# Patient Record
Sex: FEMALE | Race: WHITE | NOT HISPANIC OR LATINO | Employment: OTHER | URBAN - METROPOLITAN AREA
[De-identification: names, ages, dates, MRNs, and addresses within clinical notes are randomized per-mention and may not be internally consistent; named-entity substitution may affect disease eponyms.]

---

## 2020-04-03 RX ORDER — GABAPENTIN 300 MG/1
CAPSULE ORAL
Qty: 90 CAPSULE | Refills: 5 | Status: SHIPPED | OUTPATIENT
Start: 2020-04-03 | End: 2020-06-10 | Stop reason: SDUPTHER

## 2020-05-13 ENCOUNTER — TELEPHONE (OUTPATIENT)
Dept: FAMILY MEDICINE | Facility: CLINIC | Age: 70
End: 2020-05-13

## 2020-05-13 NOTE — TELEPHONE ENCOUNTER
Benedicto goode that patient has never been seen by Dr. Moseley or any other physicians at [facility]. Patient will need to be seen before any prescriptions can be written.

## 2020-05-13 NOTE — TELEPHONE ENCOUNTER
----- Message from Branden Hsu sent at 5/13/2020  3:29 PM CDT -----  Contact: [facility]  [phone]  Would like to follow up with nurse regarding request for glucose monitor. Please gregoria back at [phone]. ,xmd

## 2020-06-10 ENCOUNTER — OFFICE VISIT (OUTPATIENT)
Dept: FAMILY MEDICINE | Facility: CLINIC | Age: 70
End: 2020-06-10
Payer: MEDICARE

## 2020-06-10 VITALS
HEIGHT: 61 IN | HEART RATE: 78 BPM | WEIGHT: 136 LBS | DIASTOLIC BLOOD PRESSURE: 84 MMHG | BODY MASS INDEX: 25.68 KG/M2 | TEMPERATURE: 99 F | SYSTOLIC BLOOD PRESSURE: 159 MMHG

## 2020-06-10 DIAGNOSIS — F41.1 GAD (GENERALIZED ANXIETY DISORDER): Primary | ICD-10-CM

## 2020-06-10 DIAGNOSIS — R32 URINARY INCONTINENCE, UNSPECIFIED TYPE: ICD-10-CM

## 2020-06-10 DIAGNOSIS — Z79.4 TYPE 2 DIABETES MELLITUS WITH DIABETIC POLYNEUROPATHY, WITH LONG-TERM CURRENT USE OF INSULIN: ICD-10-CM

## 2020-06-10 DIAGNOSIS — M19.041 PRIMARY OSTEOARTHRITIS OF BOTH HANDS: ICD-10-CM

## 2020-06-10 DIAGNOSIS — I65.23 BILATERAL CAROTID ARTERY STENOSIS: ICD-10-CM

## 2020-06-10 DIAGNOSIS — I10 ESSENTIAL HYPERTENSION: ICD-10-CM

## 2020-06-10 DIAGNOSIS — B07.0 PLANTAR WART: ICD-10-CM

## 2020-06-10 DIAGNOSIS — F41.9 ANXIETY: ICD-10-CM

## 2020-06-10 DIAGNOSIS — Z86.73 HISTORY OF CVA (CEREBROVASCULAR ACCIDENT): ICD-10-CM

## 2020-06-10 DIAGNOSIS — R42 VERTIGO: ICD-10-CM

## 2020-06-10 DIAGNOSIS — F51.01 PRIMARY INSOMNIA: ICD-10-CM

## 2020-06-10 DIAGNOSIS — E11.42 TYPE 2 DIABETES MELLITUS WITH DIABETIC POLYNEUROPATHY, WITH LONG-TERM CURRENT USE OF INSULIN: ICD-10-CM

## 2020-06-10 DIAGNOSIS — M19.042 PRIMARY OSTEOARTHRITIS OF BOTH HANDS: ICD-10-CM

## 2020-06-10 PROCEDURE — 99204 OFFICE O/P NEW MOD 45 MIN: CPT | Mod: S$GLB,,, | Performed by: INTERNAL MEDICINE

## 2020-06-10 PROCEDURE — 3079F DIAST BP 80-89 MM HG: CPT | Mod: CPTII,S$GLB,, | Performed by: INTERNAL MEDICINE

## 2020-06-10 PROCEDURE — 99999 PR PBB SHADOW E&M-EST. PATIENT-LVL IV: ICD-10-PCS | Mod: PBBFAC,,, | Performed by: INTERNAL MEDICINE

## 2020-06-10 PROCEDURE — 3079F PR MOST RECENT DIASTOLIC BLOOD PRESSURE 80-89 MM HG: ICD-10-PCS | Mod: CPTII,S$GLB,, | Performed by: INTERNAL MEDICINE

## 2020-06-10 PROCEDURE — 1100F PR PT FALLS ASSESS DOC 2+ FALLS/FALL W/INJURY/YR: ICD-10-PCS | Mod: CPTII,S$GLB,, | Performed by: INTERNAL MEDICINE

## 2020-06-10 PROCEDURE — 3077F SYST BP >= 140 MM HG: CPT | Mod: CPTII,S$GLB,, | Performed by: INTERNAL MEDICINE

## 2020-06-10 PROCEDURE — 99999 PR PBB SHADOW E&M-EST. PATIENT-LVL IV: CPT | Mod: PBBFAC,,, | Performed by: INTERNAL MEDICINE

## 2020-06-10 PROCEDURE — 99204 PR OFFICE/OUTPT VISIT, NEW, LEVL IV, 45-59 MIN: ICD-10-PCS | Mod: S$GLB,,, | Performed by: INTERNAL MEDICINE

## 2020-06-10 PROCEDURE — 1100F PTFALLS ASSESS-DOCD GE2>/YR: CPT | Mod: CPTII,S$GLB,, | Performed by: INTERNAL MEDICINE

## 2020-06-10 PROCEDURE — 1159F MED LIST DOCD IN RCRD: CPT | Mod: S$GLB,,, | Performed by: INTERNAL MEDICINE

## 2020-06-10 PROCEDURE — 3288F PR FALLS RISK ASSESSMENT DOCUMENTED: ICD-10-PCS | Mod: CPTII,S$GLB,, | Performed by: INTERNAL MEDICINE

## 2020-06-10 PROCEDURE — 3288F FALL RISK ASSESSMENT DOCD: CPT | Mod: CPTII,S$GLB,, | Performed by: INTERNAL MEDICINE

## 2020-06-10 PROCEDURE — 1159F PR MEDICATION LIST DOCUMENTED IN MEDICAL RECORD: ICD-10-PCS | Mod: S$GLB,,, | Performed by: INTERNAL MEDICINE

## 2020-06-10 PROCEDURE — 1126F PR PAIN SEVERITY QUANTIFIED, NO PAIN PRESENT: ICD-10-PCS | Mod: S$GLB,,, | Performed by: INTERNAL MEDICINE

## 2020-06-10 PROCEDURE — 3077F PR MOST RECENT SYSTOLIC BLOOD PRESSURE >= 140 MM HG: ICD-10-PCS | Mod: CPTII,S$GLB,, | Performed by: INTERNAL MEDICINE

## 2020-06-10 PROCEDURE — 1126F AMNT PAIN NOTED NONE PRSNT: CPT | Mod: S$GLB,,, | Performed by: INTERNAL MEDICINE

## 2020-06-10 RX ORDER — ROSUVASTATIN CALCIUM 20 MG/1
1 TABLET, COATED ORAL DAILY
COMMUNITY
Start: 2020-06-02 | End: 2020-06-10 | Stop reason: SDUPTHER

## 2020-06-10 RX ORDER — OXYBUTYNIN CHLORIDE 10 MG/1
1 TABLET, EXTENDED RELEASE ORAL DAILY
COMMUNITY
Start: 2020-06-02 | End: 2020-06-10 | Stop reason: SDUPTHER

## 2020-06-10 RX ORDER — ASPIRIN 81 MG/1
81 TABLET ORAL DAILY
COMMUNITY
End: 2024-03-25

## 2020-06-10 RX ORDER — OXYBUTYNIN CHLORIDE 10 MG/1
10 TABLET, EXTENDED RELEASE ORAL DAILY
Qty: 90 TABLET | Refills: 3 | Status: SHIPPED | OUTPATIENT
Start: 2020-06-10 | End: 2020-09-28 | Stop reason: SDUPTHER

## 2020-06-10 RX ORDER — INSULIN GLARGINE 100 [IU]/ML
10 INJECTION, SOLUTION SUBCUTANEOUS NIGHTLY
Qty: 3 ML | Refills: 11 | Status: SHIPPED | OUTPATIENT
Start: 2020-06-10 | End: 2020-06-15 | Stop reason: ALTCHOICE

## 2020-06-10 RX ORDER — TRAZODONE HYDROCHLORIDE 50 MG/1
50 TABLET ORAL NIGHTLY
Qty: 90 TABLET | Refills: 1 | Status: SHIPPED | OUTPATIENT
Start: 2020-06-10 | End: 2021-01-26 | Stop reason: SDUPTHER

## 2020-06-10 RX ORDER — AMLODIPINE BESYLATE 10 MG/1
10 TABLET ORAL DAILY
Qty: 90 TABLET | Refills: 1 | Status: SHIPPED | OUTPATIENT
Start: 2020-06-10 | End: 2020-09-28 | Stop reason: SDUPTHER

## 2020-06-10 RX ORDER — MELOXICAM 15 MG/1
15 TABLET ORAL
COMMUNITY
Start: 2020-01-07 | End: 2020-06-10 | Stop reason: SDUPTHER

## 2020-06-10 RX ORDER — ONDANSETRON 4 MG/1
4 TABLET, FILM COATED ORAL EVERY 8 HOURS PRN
COMMUNITY
Start: 2020-04-24 | End: 2021-09-20

## 2020-06-10 RX ORDER — TRAZODONE HYDROCHLORIDE 50 MG/1
50 TABLET ORAL NIGHTLY
COMMUNITY
End: 2020-06-10 | Stop reason: SDUPTHER

## 2020-06-10 RX ORDER — MELOXICAM 15 MG/1
15 TABLET ORAL
Qty: 30 TABLET | Refills: 3 | Status: SHIPPED | OUTPATIENT
Start: 2020-06-10 | End: 2020-09-28

## 2020-06-10 RX ORDER — INSULIN PUMP SYRINGE, 3 ML
EACH MISCELLANEOUS
COMMUNITY
Start: 2015-05-13 | End: 2020-09-28

## 2020-06-10 RX ORDER — MECLIZINE HCL 12.5 MG 12.5 MG/1
12.5 TABLET ORAL 3 TIMES DAILY PRN
Qty: 30 TABLET | Refills: 1 | Status: SHIPPED | OUTPATIENT
Start: 2020-06-10 | End: 2020-08-13

## 2020-06-10 RX ORDER — AMLODIPINE BESYLATE 10 MG/1
1 TABLET ORAL DAILY
COMMUNITY
Start: 2020-06-02 | End: 2020-06-10 | Stop reason: SDUPTHER

## 2020-06-10 RX ORDER — CLOPIDOGREL BISULFATE 75 MG/1
1 TABLET ORAL DAILY
COMMUNITY
Start: 2020-06-02 | End: 2020-06-10

## 2020-06-10 RX ORDER — INSULIN GLARGINE 100 [IU]/ML
10 INJECTION, SOLUTION SUBCUTANEOUS NIGHTLY
COMMUNITY
Start: 2020-01-16 | End: 2020-06-10 | Stop reason: SDUPTHER

## 2020-06-10 RX ORDER — ESCITALOPRAM OXALATE 10 MG/1
10 TABLET ORAL DAILY
Qty: 30 TABLET | Refills: 11 | Status: SHIPPED | OUTPATIENT
Start: 2020-06-10 | End: 2020-09-28

## 2020-06-10 RX ORDER — ALPRAZOLAM 0.5 MG/1
0.5 TABLET ORAL NIGHTLY
COMMUNITY
Start: 2020-05-04 | End: 2020-12-16 | Stop reason: ALTCHOICE

## 2020-06-10 RX ORDER — PEN NEEDLE, DIABETIC 29 G X1/2"
NEEDLE, DISPOSABLE MISCELLANEOUS
COMMUNITY
Start: 2015-05-13 | End: 2020-06-10 | Stop reason: SDUPTHER

## 2020-06-10 RX ORDER — PEN NEEDLE, DIABETIC 29 G X1/2"
NEEDLE, DISPOSABLE MISCELLANEOUS
Qty: 100 EACH | Refills: 11 | Status: SHIPPED | OUTPATIENT
Start: 2020-06-10

## 2020-06-10 RX ORDER — ROSUVASTATIN CALCIUM 20 MG/1
20 TABLET, COATED ORAL DAILY
Qty: 90 TABLET | Refills: 3 | Status: SHIPPED | OUTPATIENT
Start: 2020-06-10 | End: 2020-12-16 | Stop reason: SDUPTHER

## 2020-06-10 RX ORDER — GABAPENTIN 300 MG/1
300 CAPSULE ORAL 3 TIMES DAILY
Qty: 90 CAPSULE | Refills: 5 | Status: SHIPPED | OUTPATIENT
Start: 2020-06-10 | End: 2020-09-28 | Stop reason: SDUPTHER

## 2020-06-10 RX ORDER — LANCETS
EACH MISCELLANEOUS
COMMUNITY
Start: 2013-12-11 | End: 2020-09-28 | Stop reason: SDUPTHER

## 2020-06-10 RX ORDER — ASPIRIN 325 MG
325 TABLET ORAL DAILY
COMMUNITY
Start: 2020-02-02 | End: 2020-06-10 | Stop reason: ALTCHOICE

## 2020-06-10 NOTE — ASSESSMENT & PLAN NOTE
Last A1C No results found for: LABA1C, HGBA1C  Current meds: lantus 12 units,   Foot exam completed- No  Eye exam completed- No  Microalbumin completed- No  On statin therapy- yes  On ACEI/ARB- no  Ran out of insulin and needs refills. Had Januvia but insurance not paying for it. Still checking glucose, ranging from 120-280. Due for eye exam. Foot exam today.

## 2020-06-10 NOTE — PROGRESS NOTES
Assessment/Plan:    Type 2 diabetes mellitus with diabetic polyneuropathy, with long-term current use of insulin  Last A1C No results found for: LABA1C, HGBA1C  Current meds: lantus 12 units,   Foot exam completed- No  Eye exam completed- No  Microalbumin completed- No  On statin therapy- yes  On ACEI/ARB- no  Ran out of insulin and needs refills. Had Januvia but insurance not paying for it. Still checking glucose, ranging from 120-280. Due for eye exam. Foot exam today.     Hypertension  -above goal today  -currently on amlodipine 10 mg daily  -plan to add an ACE-I once labs return  -denies adverse effects of medications  -continue lifestyle modification with low sodium diet and exercise     History of CVA (cerebrovascular accident)  -history of CVA around 2014  -denies residual deficits  -still on ASA and plavix  -also on high intensity statin  -had never been on ASA prior to CVA  -stop plavix. Continue ASA but at lower dose 81 mg QD    Carotid artery stenosis  -US Jan 2020:   IMPRESSION:   1.  50-69% stenosis of the left internal carotid artery.   2.  Right carotid atheromatous plaquing. There is less than 50% diameter reduction.  -asymptomatic  -continue ASA and statin  -repeat US in one year    Insomnia  -using trazodone at night  -denies adverse effects of medication  -discussed importance of good sleep hygiene practices    Anxiety  -chronic history of anxiety  -on Xanax at night  -plan to start Lexapro  -discussed potential adverse effects  -follow-up in 4 weeks to discuss response to therapy    ______________________________________________________________________________________________________________________________    Orders this visit:    RODERICK (generalized anxiety disorder)  -     escitalopram oxalate (LEXAPRO) 10 MG tablet; Take 1 tablet (10 mg total) by mouth once daily.  Dispense: 30 tablet; Refill: 11    Essential hypertension  -     amLODIPine (NORVASC) 10 MG tablet; Take 1 tablet (10 mg total) by  "mouth once daily.  Dispense: 90 tablet; Refill: 1    Type 2 diabetes mellitus with diabetic polyneuropathy, with long-term current use of insulin  -     Comprehensive metabolic panel; Standing  -     Hemoglobin A1C; Standing  -     Microalbumin/creatinine urine ratio; Standing  -     Discontinue: insulin glargine 100 units/mL (3mL) SubQ pen; Inject 10 Units into the skin every evening.  Dispense: 3 mL; Refill: 11  -     rosuvastatin (CRESTOR) 20 MG tablet; Take 1 tablet (20 mg total) by mouth once daily.  Dispense: 90 tablet; Refill: 3  -     pen needle, diabetic 31 gauge x 1/4" Ndle; Use to check insulin twice daily  Dispense: 100 each; Refill: 11  -     gabapentin (NEURONTIN) 300 MG capsule; Take 1 capsule (300 mg total) by mouth 3 (three) times daily.  Dispense: 90 capsule; Refill: 5  -     Ambulatory referral/consult to Podiatry; Future; Expected date: 06/17/2020    History of CVA (cerebrovascular accident)    Urinary incontinence, unspecified type  -     oxybutynin (DITROPAN-XL) 10 MG 24 hr tablet; Take 1 tablet (10 mg total) by mouth once daily.  Dispense: 90 tablet; Refill: 3    Anxiety    Primary insomnia  -     traZODone (DESYREL) 50 MG tablet; Take 1 tablet (50 mg total) by mouth every evening.  Dispense: 90 tablet; Refill: 1    Primary osteoarthritis of both hands  -     meloxicam (MOBIC) 15 MG tablet; Take 1 tablet (15 mg total) by mouth as needed.  Dispense: 30 tablet; Refill: 3    Vertigo  -     meclizine (ANTIVERT) 12.5 mg tablet; Take 1 tablet (12.5 mg total) by mouth 3 (three) times daily as needed.  Dispense: 30 tablet; Refill: 1    Plantar wart  -     Ambulatory referral/consult to Podiatry; Future; Expected date: 06/17/2020    Bilateral carotid artery stenosis      Follow up in about 3 months (around 9/10/2020).    Remedios Moseley MD  ______________________________________________________________________________________________________________________________    HPI:    Patient is a new patient " to me here to establish care.  Patient is a 70-year-old  female with a past medical history of CVA, carotid artery stenosis, hypertension, type 2 diabetes, osteoarthritis, insomnia and anxiety.    Previous PCP: Dr. Whyte     Hypertension:  Above goal today.  Currently on Norvasc 10 mg daily.  Denies any adverse effects of medications.  Discussed likely needing to add another medication and will likely be ACE-inhibitor or ARB considering history of diabetes.    Type 2 diabetes:  Patient reports remains at goal previously.  She did run out of insulin so has not been using.  Previously on Januvia but insurance stopped paying for it.  Checking glucose daily with ranging from mid 100s to 200s.  Needs refill her insulin.  Does report associated neuropathy.    Osteoarthritis:  Present multiple joints.  Using meloxicam as needed.    Anxiety:  Patient reports a chronic history.  Has had worsening since the loss of family members, including her .  Anxiety worse at night..  Currently on Xanax nightly.  Patient is interested in starting on other medications for anxiety symptoms.  Denies any severe symptoms of depression.  Denies SI/HI.    CVA:  History of stroke in 2014.  Denies any residual deficits.  Patient reports that she was not on aspirin at the time of her stroke.  Started on aspirin and Plavix at that time.  Denies any recurrence of stroke since that time.  Unsure what patient was started on dual anti-platelet therapy.    Patient with no other complaints today.  Routine health maintenance reviewed.  Annual labs ordered.  Will further discusse health maintenance.      Past Medical History:  Past Medical History:   Diagnosis Date    Anxiety     Bladder incontinence     Diabetes mellitus, type 2     History of CVA (cerebrovascular accident)     Hypertension     Insomnia     Osteoarthritis      History reviewed. No pertinent surgical history.  Review of patient's allergies indicates:  No Known  "Allergies  Social History     Tobacco Use    Smoking status: Heavy Tobacco Smoker     Packs/day: 1.00     Types: Cigarettes    Smokeless tobacco: Never Used   Substance Use Topics    Alcohol use: Not Currently    Drug use: Not Currently     Family History   Problem Relation Age of Onset    Diabetes Mother     Hypertension Mother     Coronary artery disease Mother         hx of cabg    Diabetes Father     Cancer Paternal Uncle         unknown    Heart failure Daughter      Current Outpatient Medications on File Prior to Visit   Medication Sig Dispense Refill    ALPRAZolam (XANAX) 0.5 MG tablet Take 0.5 mg by mouth nightly.      aspirin (ECOTRIN) 81 MG EC tablet Take 81 mg by mouth once daily.      blood sugar diagnostic Strp Use as directed to test blood sugar BID      blood-glucose meter kit Use as directed to test blood sugar BID      lancets Misc Use as directed to monitor blood glucose three times daily before meals and once before bedtime (4 times daily)      ondansetron (ZOFRAN) 4 MG tablet Take 4 mg by mouth every 8 (eight) hours as needed.       No current facility-administered medications on file prior to visit.        Review of Systems   Constitutional: Negative for chills, diaphoresis, fatigue and fever.   HENT: Negative for congestion, ear pain, postnasal drip, sinus pain and sore throat.    Eyes: Negative for pain and redness.   Respiratory: Negative for cough, chest tightness and shortness of breath.    Cardiovascular: Negative for chest pain and leg swelling.   Gastrointestinal: Negative for abdominal pain, constipation, diarrhea, nausea and vomiting.   Genitourinary: Negative for dysuria and hematuria.   Musculoskeletal: Negative for arthralgias and joint swelling.   Skin: Negative for rash.   Neurological: Negative for dizziness, syncope and headaches.       Vitals:    06/10/20 1413   BP: (!) 159/84   Pulse: 78   Temp: 99.2 °F (37.3 °C)   Weight: 61.7 kg (136 lb)   Height: 5' 1" " (1.549 m)       Wt Readings from Last 3 Encounters:   06/10/20 61.7 kg (136 lb)       Physical Exam  Constitutional:       General: She is not in acute distress.     Appearance: She is well-developed.   HENT:      Head: Normocephalic and atraumatic.   Eyes:      Conjunctiva/sclera: Conjunctivae normal.   Neck:      Musculoskeletal: Normal range of motion and neck supple.   Cardiovascular:      Rate and Rhythm: Normal rate and regular rhythm.      Heart sounds: Normal heart sounds. No murmur.   Pulmonary:      Effort: Pulmonary effort is normal. No respiratory distress.      Breath sounds: Normal breath sounds.   Abdominal:      General: Bowel sounds are normal. There is no distension.      Palpations: Abdomen is soft.      Tenderness: There is no abdominal tenderness.   Musculoskeletal: Normal range of motion.   Skin:     General: Skin is warm and dry.      Findings: No rash.   Neurological:      Mental Status: She is alert and oriented to person, place, and time.         Protective Sensation (w/ 10 gram monofilament):  Right: Decreased  Left: Decreased    Visual Inspection:  Normal -  Bilateral    Pedal Pulses:   Right: Present  Left: Present    Posterior tibialis:   Right:Present  Left: Present

## 2020-06-10 NOTE — ASSESSMENT & PLAN NOTE
-above goal today  -currently on amlodipine 10 mg daily  -plan to add an ACE-I once labs return  -denies adverse effects of medications  -continue lifestyle modification with low sodium diet and exercise

## 2020-06-12 ENCOUNTER — TELEPHONE (OUTPATIENT)
Dept: FAMILY MEDICINE | Facility: CLINIC | Age: 70
End: 2020-06-12

## 2020-06-12 DIAGNOSIS — E11.42 TYPE 2 DIABETES MELLITUS WITH DIABETIC POLYNEUROPATHY, WITH LONG-TERM CURRENT USE OF INSULIN: Primary | ICD-10-CM

## 2020-06-12 DIAGNOSIS — Z79.4 TYPE 2 DIABETES MELLITUS WITH DIABETIC POLYNEUROPATHY, WITH LONG-TERM CURRENT USE OF INSULIN: Primary | ICD-10-CM

## 2020-06-12 NOTE — TELEPHONE ENCOUNTER
----- Message from Livan Mejia sent at 6/12/2020  9:40 AM CDT -----  Contact: pt   .Type:  Needs Medical Advice    Who Called: PAMELLA OSBORN   Symptoms (please be specific):   How long has patient had these symptom   Pharmacy name and phone #:   Would the patient rather a call back or a response via [facility]?  Call   Best Call Back Number:  [phone] (home)    Additional Information: Pt is requesting a call back from the nurse in regards to the pt leeting the Dr know that her insurance did not pay for he insulin and the pt is needing something else called in for her please

## 2020-06-12 NOTE — TELEPHONE ENCOUNTER
Patient is requesting alternative for insulin glargine 100 units/mL (3mL) SubQ pen. Insurance did not approve.

## 2020-06-15 RX ORDER — INSULIN DETEMIR 100 [IU]/ML
12 INJECTION, SOLUTION SUBCUTANEOUS NIGHTLY
Qty: 3.6 ML | Refills: 11 | Status: SHIPPED | OUTPATIENT
Start: 2020-06-15 | End: 2020-08-13

## 2020-06-15 NOTE — TELEPHONE ENCOUNTER
I have signed for the following orders AND/OR meds.  Please call the patient and ask the patient to schedule the testing AND/OR inform about any medications that were sent. Medications have been sent to pharmacy listed below      No orders of the defined types were placed in this encounter.      Medications Ordered This Encounter   Medications    insulin detemir U-100 (LEVEMIR FLEXTOUCH U-100 INSULN) 100 unit/mL (3 mL) InPn pen     Sig: Inject 12 Units into the skin every evening.     Dispense:  3.6 mL     Refill:  11         [facility]/pharmacy #[id number] - [place] - [address]  [place] [address]  Phone: [phone] Fax: [phone]

## 2020-06-18 ENCOUNTER — TELEPHONE (OUTPATIENT)
Dept: FAMILY MEDICINE | Facility: CLINIC | Age: 70
End: 2020-06-18

## 2020-06-18 DIAGNOSIS — H81.10 BENIGN PAROXYSMAL POSITIONAL VERTIGO, UNSPECIFIED LATERALITY: Primary | ICD-10-CM

## 2020-06-18 DIAGNOSIS — Z11.59 ENCOUNTER FOR HEPATITIS C SCREENING TEST FOR LOW RISK PATIENT: Primary | ICD-10-CM

## 2020-06-18 PROBLEM — I65.29 CAROTID ARTERY STENOSIS: Status: ACTIVE | Noted: 2020-06-18

## 2020-06-18 NOTE — ASSESSMENT & PLAN NOTE
-US Jan 2020:   IMPRESSION:   1.  50-69% stenosis of the left internal carotid artery.   2.  Right carotid atheromatous plaquing. There is less than 50% diameter reduction.  -asymptomatic  -continue ASA and statin  -repeat US in one year

## 2020-06-18 NOTE — ASSESSMENT & PLAN NOTE
-history of CVA around 2014  -denies residual deficits  -still on ASA and plavix  -also on high intensity statin  -had never been on ASA prior to CVA  -stop plavix. Continue ASA but at lower dose 81 mg QD

## 2020-06-18 NOTE — TELEPHONE ENCOUNTER
I have signed for the following orders AND/OR meds.  Please call the patient and ask the patient to schedule the testing AND/OR inform about any medications that were sent. Medications have been sent to pharmacy listed below      Orders Placed This Encounter   Procedures    Ambulatory referral/consult to ENT     Standing Status:   Future     Standing Expiration Date:   7/18/2021     Referral Priority:   Routine     Referral Type:   Consultation     Referral Reason:   Specialty Services Required     Requested Specialty:   Otolaryngology     Number of Visits Requested:   1              [facility]/pharmacy #[id number] - [place] - [address]  [place] [address]  Phone: [phone] Fax: [phone]

## 2020-06-18 NOTE — ASSESSMENT & PLAN NOTE
-using trazodone at night  -denies adverse effects of medication  -discussed importance of good sleep hygiene practices

## 2020-06-18 NOTE — ASSESSMENT & PLAN NOTE
-chronic history of anxiety  -on Xanax at night  -plan to start Lexapro  -discussed potential adverse effects  -follow-up in 4 weeks to discuss response to therapy

## 2020-06-22 DIAGNOSIS — R42 VERTIGO: ICD-10-CM

## 2020-06-22 RX ORDER — MECLIZINE HCL 12.5 MG 12.5 MG/1
12.5 TABLET ORAL 3 TIMES DAILY PRN
Qty: 30 TABLET | Refills: 1 | Status: CANCELLED | OUTPATIENT
Start: 2020-06-22

## 2020-08-13 ENCOUNTER — OFFICE VISIT (OUTPATIENT)
Dept: FAMILY MEDICINE | Facility: CLINIC | Age: 70
End: 2020-08-13
Payer: MEDICARE

## 2020-08-13 ENCOUNTER — TELEPHONE (OUTPATIENT)
Dept: FAMILY MEDICINE | Facility: CLINIC | Age: 70
End: 2020-08-13

## 2020-08-13 VITALS
HEIGHT: 61 IN | WEIGHT: 131.63 LBS | TEMPERATURE: 99 F | SYSTOLIC BLOOD PRESSURE: 117 MMHG | DIASTOLIC BLOOD PRESSURE: 84 MMHG | HEART RATE: 99 BPM | BODY MASS INDEX: 24.85 KG/M2

## 2020-08-13 DIAGNOSIS — Z79.4 TYPE 2 DIABETES MELLITUS WITH DIABETIC POLYNEUROPATHY, WITH LONG-TERM CURRENT USE OF INSULIN: ICD-10-CM

## 2020-08-13 DIAGNOSIS — I69.398 VERTIGO AS LATE EFFECT OF STROKE: Primary | ICD-10-CM

## 2020-08-13 DIAGNOSIS — E11.42 TYPE 2 DIABETES MELLITUS WITH DIABETIC POLYNEUROPATHY, WITH LONG-TERM CURRENT USE OF INSULIN: ICD-10-CM

## 2020-08-13 DIAGNOSIS — I10 ESSENTIAL HYPERTENSION: ICD-10-CM

## 2020-08-13 DIAGNOSIS — R42 VERTIGO AS LATE EFFECT OF STROKE: Primary | ICD-10-CM

## 2020-08-13 DIAGNOSIS — Z86.73 HISTORY OF CVA (CEREBROVASCULAR ACCIDENT): ICD-10-CM

## 2020-08-13 PROCEDURE — 3074F SYST BP LT 130 MM HG: CPT | Mod: CPTII,S$GLB,, | Performed by: FAMILY MEDICINE

## 2020-08-13 PROCEDURE — 99214 PR OFFICE/OUTPT VISIT, EST, LEVL IV, 30-39 MIN: ICD-10-PCS | Mod: S$GLB,,, | Performed by: FAMILY MEDICINE

## 2020-08-13 PROCEDURE — 1126F PR PAIN SEVERITY QUANTIFIED, NO PAIN PRESENT: ICD-10-PCS | Mod: S$GLB,,, | Performed by: FAMILY MEDICINE

## 2020-08-13 PROCEDURE — 3008F BODY MASS INDEX DOCD: CPT | Mod: CPTII,S$GLB,, | Performed by: FAMILY MEDICINE

## 2020-08-13 PROCEDURE — 99999 PR PBB SHADOW E&M-EST. PATIENT-LVL III: ICD-10-PCS | Mod: PBBFAC,,, | Performed by: FAMILY MEDICINE

## 2020-08-13 PROCEDURE — 3008F PR BODY MASS INDEX (BMI) DOCUMENTED: ICD-10-PCS | Mod: CPTII,S$GLB,, | Performed by: FAMILY MEDICINE

## 2020-08-13 PROCEDURE — 1101F PT FALLS ASSESS-DOCD LE1/YR: CPT | Mod: CPTII,S$GLB,, | Performed by: FAMILY MEDICINE

## 2020-08-13 PROCEDURE — 1159F PR MEDICATION LIST DOCUMENTED IN MEDICAL RECORD: ICD-10-PCS | Mod: S$GLB,,, | Performed by: FAMILY MEDICINE

## 2020-08-13 PROCEDURE — 99214 OFFICE O/P EST MOD 30 MIN: CPT | Mod: S$GLB,,, | Performed by: FAMILY MEDICINE

## 2020-08-13 PROCEDURE — 1101F PR PT FALLS ASSESS DOC 0-1 FALLS W/OUT INJ PAST YR: ICD-10-PCS | Mod: CPTII,S$GLB,, | Performed by: FAMILY MEDICINE

## 2020-08-13 PROCEDURE — 3079F PR MOST RECENT DIASTOLIC BLOOD PRESSURE 80-89 MM HG: ICD-10-PCS | Mod: CPTII,S$GLB,, | Performed by: FAMILY MEDICINE

## 2020-08-13 PROCEDURE — 3079F DIAST BP 80-89 MM HG: CPT | Mod: CPTII,S$GLB,, | Performed by: FAMILY MEDICINE

## 2020-08-13 PROCEDURE — 1126F AMNT PAIN NOTED NONE PRSNT: CPT | Mod: S$GLB,,, | Performed by: FAMILY MEDICINE

## 2020-08-13 PROCEDURE — 3074F PR MOST RECENT SYSTOLIC BLOOD PRESSURE < 130 MM HG: ICD-10-PCS | Mod: CPTII,S$GLB,, | Performed by: FAMILY MEDICINE

## 2020-08-13 PROCEDURE — 99999 PR PBB SHADOW E&M-EST. PATIENT-LVL III: CPT | Mod: PBBFAC,,, | Performed by: FAMILY MEDICINE

## 2020-08-13 PROCEDURE — 1159F MED LIST DOCD IN RCRD: CPT | Mod: S$GLB,,, | Performed by: FAMILY MEDICINE

## 2020-08-13 RX ORDER — INSULIN GLARGINE 100 [IU]/ML
INJECTION, SOLUTION SUBCUTANEOUS
COMMUNITY
Start: 2020-06-19 | End: 2024-03-25

## 2020-08-13 RX ORDER — MECLIZINE HYDROCHLORIDE 25 MG/1
50 TABLET ORAL 3 TIMES DAILY PRN
Qty: 120 TABLET | Refills: 1 | Status: SHIPPED | OUTPATIENT
Start: 2020-08-13 | End: 2020-09-14 | Stop reason: SDUPTHER

## 2020-08-13 NOTE — TELEPHONE ENCOUNTER
----- Message from Karin Hill sent at 8/13/2020  8:05 AM CDT -----  Contact: pt  Type:  Needs Medical Advice    Who Called: cierra  Symptoms (please be specific): dizzy   How long has patient had these symptoms:    Pharmacy name and phone #:      [facility]  [address]      Would the patient rather a call back or a response via [facility]? call  Best Call Back Number: [phone]  Additional Information:

## 2020-08-13 NOTE — PROGRESS NOTES
"The patient presents today to evaluate dizziness. She had CVA 5 y ago w slight dizziness then that resolved in a few weeks. About 6m ago began having positional dizziness, no orthostasis. She can elicit symptoms w head movement. She has associated nausea wo vomiting. Her BP was elevated but seems to be controlled today. Her diabetes control is unknown and labs are scheduled(offered to do labs today but pt deferred. She tried vestibular/bamance therapy and felt worse.      Past Medical History:  Past Medical History:   Diagnosis Date    Anxiety     Bladder incontinence     Diabetes mellitus, type 2     History of CVA (cerebrovascular accident)     Hypertension     Insomnia     Osteoarthritis      History reviewed. No pertinent surgical history.  Review of patient's allergies indicates:  No Known Allergies  Current Outpatient Medications on File Prior to Visit   Medication Sig Dispense Refill    ALPRAZolam (XANAX) 0.5 MG tablet Take 0.5 mg by mouth nightly.      amLODIPine (NORVASC) 10 MG tablet Take 1 tablet (10 mg total) by mouth once daily. 90 tablet 1    aspirin (ECOTRIN) 81 MG EC tablet Take 81 mg by mouth once daily.      blood sugar diagnostic Strp Use as directed to test blood sugar BID      blood-glucose meter kit Use as directed to test blood sugar BID      escitalopram oxalate (LEXAPRO) 10 MG tablet Take 1 tablet (10 mg total) by mouth once daily. 30 tablet 11    gabapentin (NEURONTIN) 300 MG capsule Take 1 capsule (300 mg total) by mouth 3 (three) times daily. 90 capsule 5    lancets Misc Use as directed to monitor blood glucose three times daily before meals and once before bedtime (4 times daily)      LANTUS SOLOSTAR U-100 INSULIN glargine 100 units/mL (3mL) SubQ pen INJECT 10 UNITS INTO THE SKIN Q NIGHT      meloxicam (MOBIC) 15 MG tablet Take 1 tablet (15 mg total) by mouth as needed. 30 tablet 3    pen needle, diabetic 31 gauge x 1/4" Ndle Use to check insulin twice daily 100 each 11 "    rosuvastatin (CRESTOR) 20 MG tablet Take 1 tablet (20 mg total) by mouth once daily. 90 tablet 3    traZODone (DESYREL) 50 MG tablet Take 1 tablet (50 mg total) by mouth every evening. 90 tablet 1    [DISCONTINUED] insulin detemir U-100 (LEVEMIR FLEXTOUCH U-100 INSULN) 100 unit/mL (3 mL) InPn pen Inject 12 Units into the skin every evening. 3.6 mL 11    ondansetron (ZOFRAN) 4 MG tablet Take 4 mg by mouth every 8 (eight) hours as needed.      oxybutynin (DITROPAN-XL) 10 MG 24 hr tablet Take 1 tablet (10 mg total) by mouth once daily. (Patient not taking: Reported on 8/13/2020) 90 tablet 3    [DISCONTINUED] meclizine (ANTIVERT) 12.5 mg tablet Take 1 tablet (12.5 mg total) by mouth 3 (three) times daily as needed. (Patient not taking: Reported on 8/13/2020) 30 tablet 1     No current facility-administered medications on file prior to visit.      Social History     Socioeconomic History    Marital status:      Spouse name: Not on file    Number of children: Not on file    Years of education: Not on file    Highest education level: Not on file   Occupational History    Not on file   Social Needs    Financial resource strain: Not on file    Food insecurity     Worry: Not on file     Inability: Not on file    Transportation needs     Medical: Not on file     Non-medical: Not on file   Tobacco Use    Smoking status: Heavy Tobacco Smoker     Packs/day: 1.00     Types: Cigarettes    Smokeless tobacco: Never Used   Substance and Sexual Activity    Alcohol use: Not Currently    Drug use: Not Currently    Sexual activity: Not on file   Lifestyle    Physical activity     Days per week: Not on file     Minutes per session: Not on file    Stress: Not on file   Relationships    Social connections     Talks on phone: Not on file     Gets together: Not on file     Attends [employer] service: Not on file     Active member of club or organization: Not on file     Attends meetings of clubs or  organizations: Not on file     Relationship status: Not on file   Other Topics Concern    Not on file   Social History Narrative    Not on file     Family History   Problem Relation Age of Onset    Diabetes Mother     Hypertension Mother     Coronary artery disease Mother         hx of cabg    Diabetes Father     Cancer Paternal Uncle         unknown    Heart failure Daughter          ROS:GENERAL: No fever, chills, fatigability or weight loss.  SKIN: No rashes, itching or changes in color or texture of skin.  HEAD: No headaches or recent head trauma.EYES: Visual acuity fine. No photophobia, ocular pain or diplopia.EARS: Denies ear pain, discharge or vertigo.NOSE: No loss of smell, no epistaxis or postnasal drip.MOUTH & THROAT: No hoarseness or change in voice. No excessive gum bleeding.NODES: Denies swollen glands.  CHEST: Denies CASTELLANOS, cyanosis, wheezing, cough and sputum production.  CARDIOVASCULAR: Denies chest pain, PND, orthopnea or reduced exercise tolerance.  ABDOMEN: Appetite fine. No weight loss. Denies diarrhea, abdominal pain, hematemesis or blood in stool.  URINARY: No flank pain, dysuria or hematuria.  PERIPHERAL VASCULAR: No claudication or cyanosis.  MUSCULOSKELETAL: See above.  NEUROLOGIC: No history of seizures, paralysis, alteration of gait or coordination.  PE:    HEAD: Normocephalic, atraumatic.EYES: PERRL. EOMI.   EARS: TM's intact. Light reflex normal. No retraction or perforation.   NOSE: Mucosa pink. Airway clear.MOUTH & THROAT: No tonsillar enlargement. No pharyngeal erythema or exudate. No stridor.  NODES: No cervical, axillary or inguinal lymph node enlargement.  CHEST: Lungs clear to auscultation.  CARDIOVASCULAR: Normal S1, S2. No rubs, murmurs or gallops.  ABDOMEN: Bowel sounds normal. Not distended. Soft. No tenderness or masses.  MUSCULOSKELETAL: No palpable abnormality  NEUROLOGIC: Cranial Nerves: II-XII grossly intact.  Motor: 5/5 strength major flexors/extensors.  Gait &  Posture: Normal fine motion. Uses walker to stabilize gait   Impression: BPPV vs central vertigo  HBP  DM  Sp CVA  Plan:Lab eval as scheduled   Rec diet and rec low Na   Previously tried meclizine 12.5 wo side effects or any change-rec incr to 25 tid x 1 week then if no better incr to 50 tid w precautions re sedation urinary retention constipation -rec Beer's criteria rec avoid use but patient is gravely disabled by her symptoms and I feel a further trial is warranted. If failure to progress consider betahistine trial or reencourage vestibular traning

## 2020-08-21 DIAGNOSIS — Z12.11 COLON CANCER SCREENING: ICD-10-CM

## 2020-08-25 ENCOUNTER — LAB VISIT (OUTPATIENT)
Dept: LAB | Facility: HOSPITAL | Age: 70
End: 2020-08-25
Attending: INTERNAL MEDICINE
Payer: MEDICARE

## 2020-08-25 DIAGNOSIS — Z79.4 TYPE 2 DIABETES MELLITUS WITH DIABETIC POLYNEUROPATHY, WITH LONG-TERM CURRENT USE OF INSULIN: ICD-10-CM

## 2020-08-25 DIAGNOSIS — E11.42 TYPE 2 DIABETES MELLITUS WITH DIABETIC POLYNEUROPATHY, WITH LONG-TERM CURRENT USE OF INSULIN: ICD-10-CM

## 2020-09-08 ENCOUNTER — PATIENT OUTREACH (OUTPATIENT)
Dept: ADMINISTRATIVE | Facility: HOSPITAL | Age: 70
End: 2020-09-08

## 2020-09-14 RX ORDER — MECLIZINE HYDROCHLORIDE 25 MG/1
50 TABLET ORAL 3 TIMES DAILY PRN
Qty: 120 TABLET | Refills: 0 | Status: SHIPPED | OUTPATIENT
Start: 2020-09-14 | End: 2020-12-16 | Stop reason: SDUPTHER

## 2020-09-14 NOTE — TELEPHONE ENCOUNTER
----- Message from Padma Frias sent at 9/14/2020  8:49 AM CDT -----  Contact: self/[phone]  Type:  RX Refill Request    Who Called: Elda Yoder  Refill or New Rx:refill  RX Name and Strength:meclizine (ANTIVERT) 25 mg tablet  How is the patient currently taking it? (ex. 1XDay): 3 time aday time 3  Is this a 30 day or 90 day RX:30  Preferred Pharmacy with phone number:  [facility]/pharmacy #[id number] - [place], [place] - [address] [address]  Phone: [phone] Fax: [phone]  Local or Mail Order:local  Ordering Provider:Dr Moseley  Would the patient rather a call back or a response via [facility]? Call back   Best Call Back Number:[phone]  Additional Information:

## 2020-09-28 ENCOUNTER — OFFICE VISIT (OUTPATIENT)
Dept: FAMILY MEDICINE | Facility: CLINIC | Age: 70
End: 2020-09-28
Payer: MEDICARE

## 2020-09-28 VITALS
SYSTOLIC BLOOD PRESSURE: 125 MMHG | HEART RATE: 105 BPM | WEIGHT: 132 LBS | HEIGHT: 61 IN | TEMPERATURE: 99 F | DIASTOLIC BLOOD PRESSURE: 76 MMHG | BODY MASS INDEX: 24.92 KG/M2

## 2020-09-28 DIAGNOSIS — R42 VERTIGO AS LATE EFFECT OF STROKE: Primary | ICD-10-CM

## 2020-09-28 DIAGNOSIS — I69.398 VERTIGO AS LATE EFFECT OF STROKE: Primary | ICD-10-CM

## 2020-09-28 DIAGNOSIS — Z12.31 ENCOUNTER FOR SCREENING MAMMOGRAM FOR BREAST CANCER: ICD-10-CM

## 2020-09-28 DIAGNOSIS — N28.9 RENAL INSUFFICIENCY: ICD-10-CM

## 2020-09-28 DIAGNOSIS — R32 URINARY INCONTINENCE, UNSPECIFIED TYPE: ICD-10-CM

## 2020-09-28 DIAGNOSIS — Z79.4 TYPE 2 DIABETES MELLITUS WITH DIABETIC POLYNEUROPATHY, WITH LONG-TERM CURRENT USE OF INSULIN: ICD-10-CM

## 2020-09-28 DIAGNOSIS — F41.1 GAD (GENERALIZED ANXIETY DISORDER): ICD-10-CM

## 2020-09-28 DIAGNOSIS — I10 ESSENTIAL HYPERTENSION: ICD-10-CM

## 2020-09-28 DIAGNOSIS — Z23 NEED FOR PNEUMOCOCCAL VACCINE: ICD-10-CM

## 2020-09-28 DIAGNOSIS — Z78.0 ASYMPTOMATIC AGE-RELATED POSTMENOPAUSAL STATE: ICD-10-CM

## 2020-09-28 DIAGNOSIS — F41.9 ANXIETY: ICD-10-CM

## 2020-09-28 DIAGNOSIS — E11.42 TYPE 2 DIABETES MELLITUS WITH DIABETIC POLYNEUROPATHY, WITH LONG-TERM CURRENT USE OF INSULIN: ICD-10-CM

## 2020-09-28 DIAGNOSIS — Z23 INFLUENZA VACCINE NEEDED: ICD-10-CM

## 2020-09-28 PROCEDURE — G0009 PNEUMOCOCCAL CONJUGATE VACCINE 13-VALENT LESS THAN 5YO & GREATER THAN: ICD-10-PCS | Mod: S$GLB,,, | Performed by: INTERNAL MEDICINE

## 2020-09-28 PROCEDURE — 3008F BODY MASS INDEX DOCD: CPT | Mod: CPTII,S$GLB,, | Performed by: INTERNAL MEDICINE

## 2020-09-28 PROCEDURE — 90694 FLU VACCINE - QUADRIVALENT - ADJUVANTED: ICD-10-PCS | Mod: S$GLB,,, | Performed by: INTERNAL MEDICINE

## 2020-09-28 PROCEDURE — 1126F AMNT PAIN NOTED NONE PRSNT: CPT | Mod: S$GLB,,, | Performed by: INTERNAL MEDICINE

## 2020-09-28 PROCEDURE — 3051F PR MOST RECENT HEMOGLOBIN A1C LEVEL 7.0 - < 8.0%: ICD-10-PCS | Mod: CPTII,S$GLB,, | Performed by: INTERNAL MEDICINE

## 2020-09-28 PROCEDURE — G0008 ADMIN INFLUENZA VIRUS VAC: HCPCS | Mod: S$GLB,,, | Performed by: INTERNAL MEDICINE

## 2020-09-28 PROCEDURE — 1101F PT FALLS ASSESS-DOCD LE1/YR: CPT | Mod: CPTII,S$GLB,, | Performed by: INTERNAL MEDICINE

## 2020-09-28 PROCEDURE — 99214 PR OFFICE/OUTPT VISIT, EST, LEVL IV, 30-39 MIN: ICD-10-PCS | Mod: 25,S$GLB,, | Performed by: INTERNAL MEDICINE

## 2020-09-28 PROCEDURE — 3074F PR MOST RECENT SYSTOLIC BLOOD PRESSURE < 130 MM HG: ICD-10-PCS | Mod: CPTII,S$GLB,, | Performed by: INTERNAL MEDICINE

## 2020-09-28 PROCEDURE — 90670 PCV13 VACCINE IM: CPT | Mod: S$GLB,,, | Performed by: INTERNAL MEDICINE

## 2020-09-28 PROCEDURE — 3051F HG A1C>EQUAL 7.0%<8.0%: CPT | Mod: CPTII,S$GLB,, | Performed by: INTERNAL MEDICINE

## 2020-09-28 PROCEDURE — 90670 PNEUMOCOCCAL CONJUGATE VACCINE 13-VALENT LESS THAN 5YO & GREATER THAN: ICD-10-PCS | Mod: S$GLB,,, | Performed by: INTERNAL MEDICINE

## 2020-09-28 PROCEDURE — G0008 FLU VACCINE - QUADRIVALENT - ADJUVANTED: ICD-10-PCS | Mod: S$GLB,,, | Performed by: INTERNAL MEDICINE

## 2020-09-28 PROCEDURE — 1126F PR PAIN SEVERITY QUANTIFIED, NO PAIN PRESENT: ICD-10-PCS | Mod: S$GLB,,, | Performed by: INTERNAL MEDICINE

## 2020-09-28 PROCEDURE — 3078F DIAST BP <80 MM HG: CPT | Mod: CPTII,S$GLB,, | Performed by: INTERNAL MEDICINE

## 2020-09-28 PROCEDURE — 99214 OFFICE O/P EST MOD 30 MIN: CPT | Mod: 25,S$GLB,, | Performed by: INTERNAL MEDICINE

## 2020-09-28 PROCEDURE — G0009 ADMIN PNEUMOCOCCAL VACCINE: HCPCS | Mod: S$GLB,,, | Performed by: INTERNAL MEDICINE

## 2020-09-28 PROCEDURE — 1159F PR MEDICATION LIST DOCUMENTED IN MEDICAL RECORD: ICD-10-PCS | Mod: S$GLB,,, | Performed by: INTERNAL MEDICINE

## 2020-09-28 PROCEDURE — 90694 VACC AIIV4 NO PRSRV 0.5ML IM: CPT | Mod: S$GLB,,, | Performed by: INTERNAL MEDICINE

## 2020-09-28 PROCEDURE — 99999 PR PBB SHADOW E&M-EST. PATIENT-LVL V: CPT | Mod: PBBFAC,,, | Performed by: INTERNAL MEDICINE

## 2020-09-28 PROCEDURE — 1101F PR PT FALLS ASSESS DOC 0-1 FALLS W/OUT INJ PAST YR: ICD-10-PCS | Mod: CPTII,S$GLB,, | Performed by: INTERNAL MEDICINE

## 2020-09-28 PROCEDURE — 3008F PR BODY MASS INDEX (BMI) DOCUMENTED: ICD-10-PCS | Mod: CPTII,S$GLB,, | Performed by: INTERNAL MEDICINE

## 2020-09-28 PROCEDURE — 3078F PR MOST RECENT DIASTOLIC BLOOD PRESSURE < 80 MM HG: ICD-10-PCS | Mod: CPTII,S$GLB,, | Performed by: INTERNAL MEDICINE

## 2020-09-28 PROCEDURE — 1159F MED LIST DOCD IN RCRD: CPT | Mod: S$GLB,,, | Performed by: INTERNAL MEDICINE

## 2020-09-28 PROCEDURE — 3074F SYST BP LT 130 MM HG: CPT | Mod: CPTII,S$GLB,, | Performed by: INTERNAL MEDICINE

## 2020-09-28 PROCEDURE — 99999 PR PBB SHADOW E&M-EST. PATIENT-LVL V: ICD-10-PCS | Mod: PBBFAC,,, | Performed by: INTERNAL MEDICINE

## 2020-09-28 RX ORDER — GABAPENTIN 300 MG/1
300 CAPSULE ORAL 3 TIMES DAILY
Qty: 90 CAPSULE | Refills: 5 | Status: SHIPPED | OUTPATIENT
Start: 2020-09-28 | End: 2021-09-20 | Stop reason: SDUPTHER

## 2020-09-28 RX ORDER — AMLODIPINE BESYLATE 10 MG/1
10 TABLET ORAL DAILY
Qty: 90 TABLET | Refills: 3 | Status: SHIPPED | OUTPATIENT
Start: 2020-09-28 | End: 2021-10-07 | Stop reason: SDUPTHER

## 2020-09-28 RX ORDER — LANCETS
1 EACH MISCELLANEOUS DAILY PRN
Qty: 100 EACH | Refills: 11 | Status: SHIPPED | OUTPATIENT
Start: 2020-09-28

## 2020-09-28 RX ORDER — IBUPROFEN 200 MG
CAPSULE ORAL
Qty: 100 STRIP | Refills: 11 | Status: SHIPPED | OUTPATIENT
Start: 2020-09-28

## 2020-09-28 RX ORDER — ESCITALOPRAM OXALATE 20 MG/1
20 TABLET ORAL DAILY
Qty: 30 TABLET | Refills: 11 | Status: SHIPPED | OUTPATIENT
Start: 2020-09-28 | End: 2020-12-16 | Stop reason: ALTCHOICE

## 2020-09-28 RX ORDER — INSULIN PUMP SYRINGE, 3 ML
EACH MISCELLANEOUS
Qty: 1 EACH | Refills: 0 | Status: SHIPPED | OUTPATIENT
Start: 2020-09-28

## 2020-09-28 RX ORDER — OXYBUTYNIN CHLORIDE 10 MG/1
10 TABLET, EXTENDED RELEASE ORAL DAILY
Qty: 90 TABLET | Refills: 3 | Status: SHIPPED | OUTPATIENT
Start: 2020-09-28 | End: 2021-09-20

## 2020-09-28 NOTE — ASSESSMENT & PLAN NOTE
-chronic history of anxiety  -on Xanax at night  -started on lexapro with some improvement of symptoms but still having some residual symptoms  -denies adverse effects of medication  -plan to increase to 20 mg QD  -follow up if symptoms persist

## 2020-09-28 NOTE — PATIENT INSTRUCTIONS
-Make sure that you start checking your sugars at home, at least once daily in the morning and also when you feel dizzy to make sure that it is not low. If it is low, please call me and also decrease your lantus to 10 units daily.    -Increase your lexapro to 20 mg. It is ok to two of the 10 mg tablets once daily until you run out of your current bottle, then  the new prescription    -Follow up with ENT about your dizziness. A new referral was placed to Dr. Frank in [place]

## 2020-09-28 NOTE — PROGRESS NOTES
Assessment/Plan:    Type 2 diabetes mellitus with diabetic polyneuropathy, with long-term current use of insulin  -condition is currently controlled  -current meds: lantus 12 units,   -plan to continue current medications but concern about low glucose on recent labs. Counseled on hypoglycemic awareness and treatment.  -see diabetic health maintenance listed below  -counseling provided on importance of diabetic diet and medication compliance in order to treat diabetes  -discussed diabetes disease course and potential complications    Anxiety  -chronic history of anxiety  -on Xanax at night  -started on lexapro with some improvement of symptoms but still having some residual symptoms  -denies adverse effects of medication  -plan to increase to 20 mg QD  -follow up if symptoms persist    Hypertension  -at goal today  -currently on amlodipine 10 mg QD  -consider adding low dose ACEI/ARB if microalbumin increases  -continue lifestyle modification with low sodium diet and exercise   -discussed hypertension disease course and importance of treating high blood pressure  -patient understood and advised of risk of untreated blood pressure.  ER precautions were given   for symptoms of hypertensive urgency and emergency.     Vertigo as late effect of stroke  -has been evaluated by ENT recently and referred to vestibular rehab  -taking meclizine PRN but symptom still bothersome  -last MRI in April 2020 with on chronic ischemic changes  -patient interested in second opinion from ENT; referral placed      Renal insufficiency  -hx of fluctuating renal function in past  -mild decline on recent labs  -recheck renal function in several months  _____________________________________________________________________________________________________________________________________________________    Orders this visit:    Vertigo as late effect of stroke  -     Ambulatory referral/consult to ENT; Future; Expected date: 10/05/2020    Type 2  diabetes mellitus with diabetic polyneuropathy, with long-term current use of insulin  -     gabapentin (NEURONTIN) 300 MG capsule; Take 1 capsule (300 mg total) by mouth 3 (three) times daily.  Dispense: 90 capsule; Refill: 5  -     blood sugar diagnostic (BLOOD GLUCOSE TEST) Strp; Use 1-2 x a day to check glucoses before meals.  Dispense: 100 strip; Refill: 11  -     blood-glucose meter kit; Use as instructed  Dispense: 1 each; Refill: 0  -     lancets Misc; 1 Units by Misc.(Non-Drug; Combo Route) route daily as needed.  Dispense: 100 each; Refill: 11    Urinary incontinence, unspecified type  -     oxybutynin (DITROPAN-XL) 10 MG 24 hr tablet; Take 1 tablet (10 mg total) by mouth once daily.  Dispense: 90 tablet; Refill: 3    Essential hypertension  -     amLODIPine (NORVASC) 10 MG tablet; Take 1 tablet (10 mg total) by mouth once daily.  Dispense: 90 tablet; Refill: 3    RODERICK (generalized anxiety disorder)  -     escitalopram oxalate (LEXAPRO) 20 MG tablet; Take 1 tablet (20 mg total) by mouth once daily.  Dispense: 30 tablet; Refill: 11    Anxiety    Renal insufficiency  -     Renal function panel; Future; Expected date: 09/28/2020    Encounter for screening mammogram for breast cancer  -     Mammo Digital Screening Bilat w/ Sergio; Future; Expected date: 09/28/2020    Asymptomatic age-related postmenopausal state  -     DXA Bone Density Spine And Hip; Future; Expected date: 09/28/2020    Influenza vaccine needed  -     Influenza - Quadrivalent (Adjuvanted)    Need for pneumococcal vaccine  -     Pneumococcal Conjugate Vaccine (13 Valent) (IM)      Follow up in about 8 weeks (around 11/23/2020), or if symptoms worsen or fail to improve.    Remedios Moseley MD  _____________________________________________________________________________________________________________________________________________________    HPI:    Patient is in clinic today as an established patient here for follow up of chronic medical  conditions.    Vertigo: Continues to have severe symptoms of vertigo. Has been a chronic issue since her stroke earlier this year. Has had imaging since with only chronic ischemic changes. Taking meclizine but not with complete relief. Has been evaluated by ENT and started vestibular therapy but felt that symptoms worsened once started. Has not returned to ENT since then.     HTN: The patient is currently being treated for essential hypertension. This condition is chronic and stable. The patient is tolerating their medication well with good compliance.  Denies any adverse effects of medications.  Counseling was offered regarding low sodium diet.  The patient has a reduced salt intake. Routine exercise recommended. The patient denies headache, vision changes, chest pain, palpitations, shortness of breath, or lower extremity edema.    DM2: Patient presents for follow up of diabetes. Condition is chronic and stable. Patient denies symptoms, including foot ulcerations, hyperglycemia, hypoglycemia , nausea, paresthesia of the feet, polydipsia, polyuria and visual disturbances.  Evaluation to date has been included: fasting blood sugar, fasting lipid panel, hemoglobin A1C and microalbuminuria.  Home fasting sugars: not checking. Treatment to date:  lantus. Denies adverse effects of medications.     Diabetes Management Status    Statin: Taking  ACE/ARB: Not taking    Screening or Prevention Patient's value Goal Complete/Controlled?   HgA1C Testing and Control   Lab Results   Component Value Date    HGBA1C 7.0 (H) 08/25/2020      Annually/Less than 8% Yes   Lipid profile : 02/01/2020 Annually No   LDL control No results found for: LDLCALC Annually/Less than 100 mg/dl  No   Nephropathy screening No results found for: LABMICR  No results found for: PROTEINUA Annually No   Blood pressure BP Readings from Last 1 Encounters:   09/28/20 125/76    Less than 140/90 Yes   Dilated retinal exam Most Recent Eye Exam Date: Not Found  "Annually No   Foot exam   : 06/10/2020 Annually Yes           Past Medical History:  Past Medical History:   Diagnosis Date    Anxiety     Bladder incontinence     Diabetes mellitus, type 2     History of CVA (cerebrovascular accident)     Hypertension     Insomnia     Osteoarthritis      History reviewed. No pertinent surgical history.  Review of patient's allergies indicates:  No Known Allergies  Social History     Tobacco Use    Smoking status: Heavy Tobacco Smoker     Packs/day: 1.00     Types: Cigarettes    Smokeless tobacco: Never Used   Substance Use Topics    Alcohol use: Not Currently    Drug use: Not Currently     Family History   Problem Relation Age of Onset    Diabetes Mother     Hypertension Mother     Coronary artery disease Mother         hx of cabg    Diabetes Father     Cancer Paternal Uncle         unknown    Heart failure Daughter      Current Outpatient Medications on File Prior to Visit   Medication Sig Dispense Refill    aspirin (ECOTRIN) 81 MG EC tablet Take 81 mg by mouth once daily.      LANTUS SOLOSTAR U-100 INSULIN glargine 100 units/mL (3mL) SubQ pen INJECT 10 UNITS INTO THE SKIN Q NIGHT      meclizine (ANTIVERT) 25 mg tablet Take 2 tablets (50 mg total) by mouth 3 (three) times daily as needed for Dizziness. 120 tablet 0    ondansetron (ZOFRAN) 4 MG tablet Take 4 mg by mouth every 8 (eight) hours as needed.      pen needle, diabetic 31 gauge x 1/4" Ndle Use to check insulin twice daily 100 each 11    rosuvastatin (CRESTOR) 20 MG tablet Take 1 tablet (20 mg total) by mouth once daily. 90 tablet 3    traZODone (DESYREL) 50 MG tablet Take 1 tablet (50 mg total) by mouth every evening. 90 tablet 1    ALPRAZolam (XANAX) 0.5 MG tablet Take 0.5 mg by mouth nightly.       No current facility-administered medications on file prior to visit.        Review of Systems   Constitutional: Negative for chills, diaphoresis, fatigue and fever.   HENT: Negative for congestion, ear " "pain, postnasal drip, sinus pain and sore throat.    Eyes: Negative for pain and redness.   Respiratory: Negative for cough, chest tightness and shortness of breath.    Cardiovascular: Negative for chest pain and leg swelling.   Gastrointestinal: Negative for abdominal pain, constipation, diarrhea, nausea and vomiting.   Genitourinary: Negative for dysuria and hematuria.   Musculoskeletal: Negative for arthralgias and joint swelling.   Skin: Negative for rash.   Neurological: Positive for dizziness. Negative for syncope, weakness, light-headedness and headaches.       Vitals:    09/28/20 1302   BP: 125/76   Pulse: 105   Temp: 99.3 °F (37.4 °C)   Weight: 59.9 kg (132 lb)   Height: 5' 1" (1.549 m)       Wt Readings from Last 3 Encounters:   09/28/20 59.9 kg (132 lb)   08/13/20 59.7 kg (131 lb 9.6 oz)   06/10/20 61.7 kg (136 lb)       Physical Exam  Constitutional:       General: She is not in acute distress.     Appearance: Normal appearance. She is well-developed.   HENT:      Head: Normocephalic and atraumatic.   Eyes:      Conjunctiva/sclera: Conjunctivae normal.   Neck:      Musculoskeletal: Normal range of motion and neck supple.   Cardiovascular:      Rate and Rhythm: Normal rate and regular rhythm.      Pulses: Normal pulses.      Heart sounds: Normal heart sounds. No murmur.   Pulmonary:      Effort: Pulmonary effort is normal. No respiratory distress.      Breath sounds: Normal breath sounds.   Abdominal:      General: Bowel sounds are normal. There is no distension.      Palpations: Abdomen is soft.      Tenderness: There is no abdominal tenderness.   Musculoskeletal: Normal range of motion.   Skin:     General: Skin is warm and dry.      Findings: No rash.   Neurological:      General: No focal deficit present.      Mental Status: She is alert and oriented to person, place, and time.         Health Maintenance   Topic Date Due    Eye Exam  04/30/1962    Urine Microalbumin  04/30/1962    TETANUS VACCINE  " 04/30/1970    Mammogram  04/30/1992    DEXA SCAN  04/30/1992    Lipid Panel  02/01/2021    Hemoglobin A1c  02/25/2021    Foot Exam  06/10/2021    High Dose Statin  09/28/2021    Pneumococcal Vaccine (65+ Low/Medium Risk) (2 of 2 - PPSV23) 09/28/2021    Aspirin/Antiplatelet Therapy  09/28/2021    Hepatitis C Screening  Completed

## 2020-09-28 NOTE — ASSESSMENT & PLAN NOTE
-condition is currently controlled  -current meds: lantus 12 units,   -plan to continue current medications but concern about low glucose on recent labs. Counseled on hypoglycemic awareness and treatment.  -see diabetic health maintenance listed below  -counseling provided on importance of diabetic diet and medication compliance in order to treat diabetes  -discussed diabetes disease course and potential complications

## 2020-09-30 PROBLEM — R42 VERTIGO AS LATE EFFECT OF STROKE: Status: ACTIVE | Noted: 2020-09-30

## 2020-09-30 PROBLEM — I69.398 VERTIGO AS LATE EFFECT OF STROKE: Status: ACTIVE | Noted: 2020-09-30

## 2020-09-30 NOTE — ASSESSMENT & PLAN NOTE
-has been evaluated by ENT recently and referred to vestibular rehab  -taking meclizine PRN but symptom still bothersome  -last MRI in April 2020 with on chronic ischemic changes  -patient interested in second opinion from ENT; referral placed

## 2020-09-30 NOTE — ASSESSMENT & PLAN NOTE
-at goal today  -currently on amlodipine 10 mg QD  -consider adding low dose ACEI/ARB if microalbumin increases  -continue lifestyle modification with low sodium diet and exercise   -discussed hypertension disease course and importance of treating high blood pressure  -patient understood and advised of risk of untreated blood pressure.  ER precautions were given   for symptoms of hypertensive urgency and emergency.

## 2020-10-05 ENCOUNTER — PATIENT MESSAGE (OUTPATIENT)
Dept: ADMINISTRATIVE | Facility: HOSPITAL | Age: 70
End: 2020-10-05

## 2020-10-07 ENCOUNTER — TELEPHONE (OUTPATIENT)
Dept: FAMILY MEDICINE | Facility: CLINIC | Age: 70
End: 2020-10-07

## 2020-10-07 NOTE — TELEPHONE ENCOUNTER
----- Message from Paulino Loja sent at 10/7/2020  1:26 PM CDT -----  Regarding: pt  Pt is requesting to have her last office visit summary faxed over to her new physician . Please call back at .[phone] (home)       [phone] (FAX )    Thank you,   Paulino Loja

## 2020-10-07 NOTE — TELEPHONE ENCOUNTER
Spoke with patient. She reports that she has moved out of [place] and will be seeing a new family practice physician in [place].     Last office note faxed to:  [facility] [place]  Fax: [phone]

## 2020-10-20 ENCOUNTER — TELEPHONE (OUTPATIENT)
Dept: FAMILY MEDICINE | Facility: CLINIC | Age: 70
End: 2020-10-20

## 2020-10-20 NOTE — TELEPHONE ENCOUNTER
Spoke with pt, informed that GOMEZ needed to be filled out in order to have entire record sent to another provider.

## 2020-10-20 NOTE — TELEPHONE ENCOUNTER
----- Message from Aide Mcgarry sent at 10/20/2020  2:26 PM CDT -----  Regarding: records  Good afternoon,      Pt would like a call back in regards to her records and cn joao reached at [phone]      Thanks,  Aide Mcgarry

## 2020-10-30 ENCOUNTER — PATIENT MESSAGE (OUTPATIENT)
Dept: ADMINISTRATIVE | Facility: HOSPITAL | Age: 70
End: 2020-10-30

## 2020-11-09 ENCOUNTER — TELEPHONE (OUTPATIENT)
Dept: FAMILY MEDICINE | Facility: CLINIC | Age: 70
End: 2020-11-09

## 2020-11-09 NOTE — TELEPHONE ENCOUNTER
----- Message from Kaia Verdugo sent at 11/9/2020  9:03 AM CST -----  Regarding: request call back  Pt request call back regarding copy of medical records ... call back:[phone] (home)

## 2020-12-16 ENCOUNTER — OFFICE VISIT (OUTPATIENT)
Dept: FAMILY MEDICINE | Facility: CLINIC | Age: 70
End: 2020-12-16
Payer: MEDICARE

## 2020-12-16 VITALS
HEIGHT: 61 IN | OXYGEN SATURATION: 97 % | HEART RATE: 97 BPM | SYSTOLIC BLOOD PRESSURE: 132 MMHG | TEMPERATURE: 97 F | DIASTOLIC BLOOD PRESSURE: 76 MMHG | BODY MASS INDEX: 26.33 KG/M2 | WEIGHT: 139.44 LBS

## 2020-12-16 DIAGNOSIS — F41.9 ANXIETY: ICD-10-CM

## 2020-12-16 DIAGNOSIS — Z79.4 TYPE 2 DIABETES MELLITUS WITH DIABETIC POLYNEUROPATHY, WITH LONG-TERM CURRENT USE OF INSULIN: ICD-10-CM

## 2020-12-16 DIAGNOSIS — E11.42 TYPE 2 DIABETES MELLITUS WITH DIABETIC POLYNEUROPATHY, WITH LONG-TERM CURRENT USE OF INSULIN: ICD-10-CM

## 2020-12-16 DIAGNOSIS — R25.1 TREMOR: ICD-10-CM

## 2020-12-16 DIAGNOSIS — H26.9 CATARACT, UNSPECIFIED CATARACT TYPE, UNSPECIFIED LATERALITY: Primary | ICD-10-CM

## 2020-12-16 DIAGNOSIS — R42 VERTIGO: ICD-10-CM

## 2020-12-16 DIAGNOSIS — Z12.11 COLON CANCER SCREENING: ICD-10-CM

## 2020-12-16 PROCEDURE — 3051F HG A1C>EQUAL 7.0%<8.0%: CPT | Mod: CPTII,S$GLB,, | Performed by: FAMILY MEDICINE

## 2020-12-16 PROCEDURE — 99499 RISK ADDL DX/OHS AUDIT: ICD-10-PCS | Mod: S$GLB,,, | Performed by: FAMILY MEDICINE

## 2020-12-16 PROCEDURE — 3075F SYST BP GE 130 - 139MM HG: CPT | Mod: CPTII,S$GLB,, | Performed by: FAMILY MEDICINE

## 2020-12-16 PROCEDURE — 1159F PR MEDICATION LIST DOCUMENTED IN MEDICAL RECORD: ICD-10-PCS | Mod: S$GLB,,, | Performed by: FAMILY MEDICINE

## 2020-12-16 PROCEDURE — 1100F PTFALLS ASSESS-DOCD GE2>/YR: CPT | Mod: CPTII,S$GLB,, | Performed by: FAMILY MEDICINE

## 2020-12-16 PROCEDURE — 3008F PR BODY MASS INDEX (BMI) DOCUMENTED: ICD-10-PCS | Mod: CPTII,S$GLB,, | Performed by: FAMILY MEDICINE

## 2020-12-16 PROCEDURE — 99214 OFFICE O/P EST MOD 30 MIN: CPT | Mod: S$GLB,,, | Performed by: FAMILY MEDICINE

## 2020-12-16 PROCEDURE — 3008F BODY MASS INDEX DOCD: CPT | Mod: CPTII,S$GLB,, | Performed by: FAMILY MEDICINE

## 2020-12-16 PROCEDURE — 1100F PR PT FALLS ASSESS DOC 2+ FALLS/FALL W/INJURY/YR: ICD-10-PCS | Mod: CPTII,S$GLB,, | Performed by: FAMILY MEDICINE

## 2020-12-16 PROCEDURE — 3075F PR MOST RECENT SYSTOLIC BLOOD PRESS GE 130-139MM HG: ICD-10-PCS | Mod: CPTII,S$GLB,, | Performed by: FAMILY MEDICINE

## 2020-12-16 PROCEDURE — 1125F AMNT PAIN NOTED PAIN PRSNT: CPT | Mod: S$GLB,,, | Performed by: FAMILY MEDICINE

## 2020-12-16 PROCEDURE — 99499 UNLISTED E&M SERVICE: CPT | Mod: S$GLB,,, | Performed by: FAMILY MEDICINE

## 2020-12-16 PROCEDURE — 3051F PR MOST RECENT HEMOGLOBIN A1C LEVEL 7.0 - < 8.0%: ICD-10-PCS | Mod: CPTII,S$GLB,, | Performed by: FAMILY MEDICINE

## 2020-12-16 PROCEDURE — 3078F PR MOST RECENT DIASTOLIC BLOOD PRESSURE < 80 MM HG: ICD-10-PCS | Mod: CPTII,S$GLB,, | Performed by: FAMILY MEDICINE

## 2020-12-16 PROCEDURE — 1125F PR PAIN SEVERITY QUANTIFIED, PAIN PRESENT: ICD-10-PCS | Mod: S$GLB,,, | Performed by: FAMILY MEDICINE

## 2020-12-16 PROCEDURE — 3288F FALL RISK ASSESSMENT DOCD: CPT | Mod: CPTII,S$GLB,, | Performed by: FAMILY MEDICINE

## 2020-12-16 PROCEDURE — 3078F DIAST BP <80 MM HG: CPT | Mod: CPTII,S$GLB,, | Performed by: FAMILY MEDICINE

## 2020-12-16 PROCEDURE — 99214 PR OFFICE/OUTPT VISIT, EST, LEVL IV, 30-39 MIN: ICD-10-PCS | Mod: S$GLB,,, | Performed by: FAMILY MEDICINE

## 2020-12-16 PROCEDURE — 1159F MED LIST DOCD IN RCRD: CPT | Mod: S$GLB,,, | Performed by: FAMILY MEDICINE

## 2020-12-16 PROCEDURE — 3288F PR FALLS RISK ASSESSMENT DOCUMENTED: ICD-10-PCS | Mod: CPTII,S$GLB,, | Performed by: FAMILY MEDICINE

## 2020-12-16 RX ORDER — ROSUVASTATIN CALCIUM 20 MG/1
20 TABLET, COATED ORAL DAILY
Qty: 90 TABLET | Refills: 3 | Status: SHIPPED | OUTPATIENT
Start: 2020-12-16 | End: 2021-10-07 | Stop reason: SDUPTHER

## 2020-12-16 RX ORDER — MECLIZINE HYDROCHLORIDE 25 MG/1
50 TABLET ORAL 3 TIMES DAILY PRN
Qty: 120 TABLET | Refills: 3 | Status: SHIPPED | OUTPATIENT
Start: 2020-12-16 | End: 2021-09-20

## 2020-12-16 RX ORDER — VENLAFAXINE HYDROCHLORIDE 75 MG/1
75 CAPSULE, EXTENDED RELEASE ORAL DAILY
Qty: 30 CAPSULE | Refills: 11 | Status: SHIPPED | OUTPATIENT
Start: 2020-12-16 | End: 2024-03-25

## 2020-12-16 RX ORDER — BUSPIRONE HYDROCHLORIDE 10 MG/1
10 TABLET ORAL 3 TIMES DAILY
Qty: 90 TABLET | Refills: 11 | Status: SHIPPED | OUTPATIENT
Start: 2020-12-16 | End: 2021-09-20

## 2020-12-16 NOTE — PROGRESS NOTES
Subjective:       Patient ID: Elda Yoder is a 70 y.o. female.    Chief Complaint: Establish Care    Patient presents to [facility] care    She has a history of a CVa that affected her left side in about 2015.      Overactive bladder:  Takes oxybutynin and this works well    Diabetes  She presents for her follow-up diabetic visit. She has type 2 diabetes mellitus. Disease course: Not checking her sugars right now.  Hypoglycemia symptoms include dizziness. Pertinent negatives for hypoglycemia include no confusion, headaches, hunger, mood changes, nervousness/anxiousness, pallor, seizures, sleepiness, speech difficulty, sweats or tremors. Associated symptoms include fatigue, foot paresthesias, visual change and weakness. Pertinent negatives for diabetes include no blurred vision, no chest pain, no foot ulcerations, no polydipsia, no polyphagia, no polyuria and no weight loss. There are no hypoglycemic complications. Symptoms are stable. Diabetic complications include a CVA and peripheral neuropathy. Risk factors for coronary artery disease include diabetes mellitus, dyslipidemia, sedentary lifestyle, stress and tobacco exposure. Current diabetic treatment includes insulin injections. She rarely participates in exercise. She does not see a podiatrist.Eye exam is current.   Dizziness:   Chronicity - acute exacerbation: dizziness for about 3 months.    Progression since onset:  Unchanged  Frequency - weeks/days included: when she is up and about.  No dizziness when in bed.   Duration:  Off/on all day   Associated symptoms: nausea, weakness, visual disturbances, light-headedness and panic.no hearing loss, no ear congestion, no ear pain, no fever, no headaches, no tinnitus, no vomiting, no diaphoresis, no aural fullness, no syncope, no palpitations, no facial weakness, no slurred speech and no chest pain.  Aggravated by:  Getting up  Risk factors:  Stroke  Treatments tried:  Meclizine (Physical therapy made it worse for  her. )   PMH includes: strokes and anxiety.   Neurology said the dissiness is due to her stroke.   Anxiety  Symptoms include dizziness, nausea and panic. Patient reports no chest pain, confusion, nervous/anxious behavior, palpitations or shortness of breath.     (Neurology said the dissiness is due to her stroke. )     Review of Systems   Constitutional: Positive for fatigue. Negative for activity change, appetite change, chills, diaphoresis, fever and weight loss.   HENT: Negative for nasal congestion, ear discharge, ear pain, hearing loss, rhinorrhea, sore throat, tinnitus and trouble swallowing.    Eyes: Negative for blurred vision, photophobia, pain, redness, itching and visual disturbance.   Respiratory: Negative for cough, chest tightness, shortness of breath and wheezing.    Cardiovascular: Negative for chest pain, palpitations, leg swelling and syncope.   Gastrointestinal: Positive for nausea. Negative for abdominal distention, abdominal pain, blood in stool, diarrhea and vomiting.   Endocrine: Negative for polydipsia, polyphagia and polyuria.   Genitourinary: Negative for dysuria, pelvic pain, vaginal bleeding, vaginal discharge and vaginal pain.   Musculoskeletal: Negative for arthralgias, back pain, gait problem and neck pain.   Integumentary:  Negative for color change, pallor and rash.   Neurological: Positive for dizziness, weakness and light-headedness. Negative for tremors, seizures, speech difficulty, numbness and headaches.   Psychiatric/Behavioral: Negative for agitation, behavioral problems, confusion and sleep disturbance. The patient is not nervous/anxious.          Past Medical History:   Diagnosis Date    Anxiety     Bladder incontinence     Diabetes mellitus, type 2     History of CVA (cerebrovascular accident) 2015    Hypertension     Insomnia     Osteoarthritis      Social History     Socioeconomic History    Marital status:      Spouse name: Not on file    Number of  "children: Not on file    Years of education: Not on file    Highest education level: Not on file   Occupational History    Not on file   Social Needs    Financial resource strain: Not on file    Food insecurity     Worry: Not on file     Inability: Not on file    Transportation needs     Medical: Not on file     Non-medical: Not on file   Tobacco Use    Smoking status: Heavy Tobacco Smoker     Packs/day: 1.00     Types: Cigarettes    Smokeless tobacco: Never Used   Substance and Sexual Activity    Alcohol use: Not Currently    Drug use: Not Currently    Sexual activity: Not on file   Lifestyle    Physical activity     Days per week: Not on file     Minutes per session: Not on file    Stress: Not on file   Relationships    Social connections     Talks on phone: Not on file     Gets together: Not on file     Attends Synagogue service: Not on file     Active member of club or organization: Not on file     Attends meetings of clubs or organizations: Not on file     Relationship status: Not on file   Other Topics Concern    Not on file   Social History Narrative    Not on file     Family History   Problem Relation Age of Onset    Diabetes Mother     Hypertension Mother     Coronary artery disease Mother         hx of cabg    Diabetes Father     Cancer Paternal Uncle         unknown    Heart failure Daughter        Objective:     /76 (BP Location: Right arm, Patient Position: Sitting)   Pulse 97   Temp 96.9 °F (36.1 °C) (Temporal)   Ht 5' 1" (1.549 m)   Wt 63.3 kg (139 lb 7.1 oz)   SpO2 97%   BMI 26.35 kg/m²     Physical Exam  Constitutional:       Appearance: She is well-developed.   HENT:      Head: Normocephalic.   Eyes:      Conjunctiva/sclera: Conjunctivae normal.   Neck:      Musculoskeletal: Normal range of motion and neck supple.   Cardiovascular:      Rate and Rhythm: Normal rate and regular rhythm.      Heart sounds: Normal heart sounds.   Pulmonary:      Effort: Pulmonary " effort is normal.      Breath sounds: Normal breath sounds.   Skin:     General: Skin is warm and dry.   Neurological:      General: No focal deficit present.      Mental Status: She is alert and oriented to person, place, and time.      Cranial Nerves: No cranial nerve deficit.      Motor: Weakness present.      Gait: Gait abnormal.      Comments: Shuffling gait  Slight resting tremor.    Psychiatric:         Behavior: Behavior normal.         Assessment:       1. Cataract, unspecified cataract type, unspecified laterality    2. Type 2 diabetes mellitus with diabetic polyneuropathy, with long-term current use of insulin    3. Anxiety    4. Vertigo    5. Colon cancer screening    6. Tremor        Plan:       Cataract, unspecified cataract type, unspecified laterality    Type 2 diabetes mellitus with diabetic polyneuropathy, with long-term current use of insulin  -     rosuvastatin (CRESTOR) 20 MG tablet; Take 1 tablet (20 mg total) by mouth once daily.  Dispense: 90 tablet; Refill: 3  -     Ambulatory referral/consult to Ophthalmology; Future; Expected date: 12/23/2020  -     Hemoglobin A1C; Future; Expected date: 12/16/2020  -     Comprehensive Metabolic Panel; Future; Expected date: 12/16/2020  -     CBC Auto Differential; Future; Expected date: 12/16/2020  -     Lipid Panel; Future; Expected date: 12/16/2020  -     Microalbumin/Creatinine Ratio, Urine  -     Diabetes Digital Medicine (DDMP) Enrollment Order  -     Diabetes Digital Medicine (DDMP): Assign Onboarding Questionnaires    Anxiety  -     venlafaxine (EFFEXOR-XR) 75 MG 24 hr capsule; Take 1 capsule (75 mg total) by mouth once daily.  Dispense: 30 capsule; Refill: 11  -     busPIRone (BUSPAR) 10 MG tablet; Take 1 tablet (10 mg total) by mouth 3 (three) times daily.  Dispense: 90 tablet; Refill: 11    Vertigo  -     meclizine (ANTIVERT) 25 mg tablet; Take 2 tablets (50 mg total) by mouth 3 (three) times daily as needed for Dizziness.  Dispense: 120 tablet;  "Refill: 3  - Multiple causes that could contribute to dizziness including stroke, and medications, neuropathy, and abnormal sugars    - Will try stopping the oxybutynin and see if this helps.   - gabapenting spread out to 3 times a day rather than at HS.      Colon cancer screening  -     Case Request Endoscopy: COLONOSCOPY    Tremor    - Daughter reports that sometims patient "freezes" when walking and can't seem to move her feet.  Would consider Parkinson's    "

## 2021-01-04 ENCOUNTER — PATIENT MESSAGE (OUTPATIENT)
Dept: ADMINISTRATIVE | Facility: HOSPITAL | Age: 71
End: 2021-01-04

## 2021-01-11 ENCOUNTER — TELEPHONE (OUTPATIENT)
Dept: FAMILY MEDICINE | Facility: CLINIC | Age: 71
End: 2021-01-11

## 2021-01-21 ENCOUNTER — TELEPHONE (OUTPATIENT)
Dept: FAMILY MEDICINE | Facility: CLINIC | Age: 71
End: 2021-01-21

## 2021-01-26 DIAGNOSIS — F51.01 PRIMARY INSOMNIA: ICD-10-CM

## 2021-01-26 RX ORDER — TRAZODONE HYDROCHLORIDE 50 MG/1
50 TABLET ORAL NIGHTLY
Qty: 90 TABLET | Refills: 1 | Status: SHIPPED | OUTPATIENT
Start: 2021-01-26 | End: 2024-03-25

## 2021-04-05 ENCOUNTER — PATIENT MESSAGE (OUTPATIENT)
Dept: ADMINISTRATIVE | Facility: HOSPITAL | Age: 71
End: 2021-04-05

## 2021-04-08 ENCOUNTER — PATIENT OUTREACH (OUTPATIENT)
Dept: ADMINISTRATIVE | Facility: HOSPITAL | Age: 71
End: 2021-04-08

## 2021-06-08 ENCOUNTER — TELEPHONE (OUTPATIENT)
Dept: FAMILY MEDICINE | Facility: CLINIC | Age: 71
End: 2021-06-08

## 2021-07-07 ENCOUNTER — PATIENT MESSAGE (OUTPATIENT)
Dept: ADMINISTRATIVE | Facility: HOSPITAL | Age: 71
End: 2021-07-07

## 2021-08-04 ENCOUNTER — PATIENT MESSAGE (OUTPATIENT)
Dept: ADMINISTRATIVE | Facility: HOSPITAL | Age: 71
End: 2021-08-04

## 2021-08-23 ENCOUNTER — PATIENT MESSAGE (OUTPATIENT)
Dept: ADMINISTRATIVE | Facility: HOSPITAL | Age: 71
End: 2021-08-23

## 2021-08-23 ENCOUNTER — PATIENT OUTREACH (OUTPATIENT)
Dept: ADMINISTRATIVE | Facility: HOSPITAL | Age: 71
End: 2021-08-23

## 2021-09-07 ENCOUNTER — PATIENT MESSAGE (OUTPATIENT)
Dept: ADMINISTRATIVE | Facility: HOSPITAL | Age: 71
End: 2021-09-07

## 2021-09-08 ENCOUNTER — TELEPHONE (OUTPATIENT)
Dept: FAMILY MEDICINE | Facility: CLINIC | Age: 71
End: 2021-09-08

## 2021-09-09 ENCOUNTER — TELEPHONE (OUTPATIENT)
Dept: FAMILY MEDICINE | Facility: CLINIC | Age: 71
End: 2021-09-09

## 2021-09-20 ENCOUNTER — LAB VISIT (OUTPATIENT)
Dept: LAB | Facility: HOSPITAL | Age: 71
End: 2021-09-20
Attending: INTERNAL MEDICINE
Payer: MEDICARE

## 2021-09-20 ENCOUNTER — TELEPHONE (OUTPATIENT)
Dept: FAMILY MEDICINE | Facility: CLINIC | Age: 71
End: 2021-09-20

## 2021-09-20 ENCOUNTER — OFFICE VISIT (OUTPATIENT)
Dept: FAMILY MEDICINE | Facility: CLINIC | Age: 71
End: 2021-09-20
Payer: MEDICARE

## 2021-09-20 VITALS
SYSTOLIC BLOOD PRESSURE: 152 MMHG | HEART RATE: 90 BPM | WEIGHT: 136 LBS | BODY MASS INDEX: 25.68 KG/M2 | HEIGHT: 61 IN | DIASTOLIC BLOOD PRESSURE: 89 MMHG | TEMPERATURE: 98 F

## 2021-09-20 DIAGNOSIS — Z86.73 HISTORY OF CVA (CEREBROVASCULAR ACCIDENT): ICD-10-CM

## 2021-09-20 DIAGNOSIS — I69.398 VERTIGO AS LATE EFFECT OF STROKE: ICD-10-CM

## 2021-09-20 DIAGNOSIS — I65.23 BILATERAL CAROTID ARTERY STENOSIS: ICD-10-CM

## 2021-09-20 DIAGNOSIS — R32 URINARY INCONTINENCE, UNSPECIFIED TYPE: ICD-10-CM

## 2021-09-20 DIAGNOSIS — M54.42 ACUTE LEFT-SIDED LOW BACK PAIN WITH LEFT-SIDED SCIATICA: ICD-10-CM

## 2021-09-20 DIAGNOSIS — Z79.4 TYPE 2 DIABETES MELLITUS WITH DIABETIC POLYNEUROPATHY, WITH LONG-TERM CURRENT USE OF INSULIN: ICD-10-CM

## 2021-09-20 DIAGNOSIS — L30.9 DERMATITIS: ICD-10-CM

## 2021-09-20 DIAGNOSIS — I10 ESSENTIAL HYPERTENSION: Primary | ICD-10-CM

## 2021-09-20 DIAGNOSIS — R42 VERTIGO AS LATE EFFECT OF STROKE: ICD-10-CM

## 2021-09-20 DIAGNOSIS — E11.42 TYPE 2 DIABETES MELLITUS WITH DIABETIC POLYNEUROPATHY, WITH LONG-TERM CURRENT USE OF INSULIN: ICD-10-CM

## 2021-09-20 LAB
BACTERIA #/AREA URNS HPF: ABNORMAL /HPF
BILIRUB UR QL STRIP: NEGATIVE
CLARITY UR: CLEAR
COLOR UR: YELLOW
GLUCOSE UR QL STRIP: NEGATIVE
HGB UR QL STRIP: ABNORMAL
HYALINE CASTS #/AREA URNS LPF: 3 /LPF
KETONES UR QL STRIP: NEGATIVE
LEUKOCYTE ESTERASE UR QL STRIP: ABNORMAL
MICROSCOPIC COMMENT: ABNORMAL
NITRITE UR QL STRIP: NEGATIVE
PH UR STRIP: 6 [PH] (ref 5–8)
PROT UR QL STRIP: ABNORMAL
RBC #/AREA URNS HPF: 3 /HPF (ref 0–4)
SP GR UR STRIP: 1.01 (ref 1–1.03)
SQUAMOUS #/AREA URNS HPF: 10 /HPF
URN SPEC COLLECT METH UR: ABNORMAL
WBC #/AREA URNS HPF: >100 /HPF (ref 0–5)

## 2021-09-20 PROCEDURE — 85027 COMPLETE CBC AUTOMATED: CPT | Performed by: INTERNAL MEDICINE

## 2021-09-20 PROCEDURE — 99999 PR PBB SHADOW E&M-EST. PATIENT-LVL IV: ICD-10-PCS | Mod: PBBFAC,,, | Performed by: INTERNAL MEDICINE

## 2021-09-20 PROCEDURE — 99214 PR OFFICE/OUTPT VISIT, EST, LEVL IV, 30-39 MIN: ICD-10-PCS | Mod: S$GLB,,, | Performed by: INTERNAL MEDICINE

## 2021-09-20 PROCEDURE — 87088 URINE BACTERIA CULTURE: CPT | Performed by: INTERNAL MEDICINE

## 2021-09-20 PROCEDURE — 87086 URINE CULTURE/COLONY COUNT: CPT | Performed by: INTERNAL MEDICINE

## 2021-09-20 PROCEDURE — 99214 OFFICE O/P EST MOD 30 MIN: CPT | Mod: PBBFAC,PO | Performed by: INTERNAL MEDICINE

## 2021-09-20 PROCEDURE — 81000 URINALYSIS NONAUTO W/SCOPE: CPT | Mod: PO | Performed by: INTERNAL MEDICINE

## 2021-09-20 PROCEDURE — 36415 COLL VENOUS BLD VENIPUNCTURE: CPT | Mod: PO | Performed by: INTERNAL MEDICINE

## 2021-09-20 PROCEDURE — 87077 CULTURE AEROBIC IDENTIFY: CPT | Performed by: INTERNAL MEDICINE

## 2021-09-20 PROCEDURE — 99214 OFFICE O/P EST MOD 30 MIN: CPT | Mod: S$GLB,,, | Performed by: INTERNAL MEDICINE

## 2021-09-20 PROCEDURE — 99999 PR PBB SHADOW E&M-EST. PATIENT-LVL IV: CPT | Mod: PBBFAC,,, | Performed by: INTERNAL MEDICINE

## 2021-09-20 PROCEDURE — 87186 SC STD MICRODIL/AGAR DIL: CPT | Performed by: INTERNAL MEDICINE

## 2021-09-20 PROCEDURE — 80053 COMPREHEN METABOLIC PANEL: CPT | Performed by: INTERNAL MEDICINE

## 2021-09-20 PROCEDURE — 87184 SC STD DISK METHOD PER PLATE: CPT | Performed by: INTERNAL MEDICINE

## 2021-09-20 RX ORDER — SOLIFENACIN SUCCINATE 10 MG/1
10 TABLET, FILM COATED ORAL DAILY
COMMUNITY
End: 2021-09-20 | Stop reason: SDUPTHER

## 2021-09-20 RX ORDER — TRIAMCINOLONE ACETONIDE 1 MG/G
CREAM TOPICAL 2 TIMES DAILY
Qty: 30 G | Refills: 0 | Status: SHIPPED | OUTPATIENT
Start: 2021-09-20 | End: 2024-03-25

## 2021-09-20 RX ORDER — SULFAMETHOXAZOLE AND TRIMETHOPRIM 800; 160 MG/1; MG/1
1 TABLET ORAL 2 TIMES DAILY
Qty: 6 TABLET | Refills: 0 | Status: SHIPPED | OUTPATIENT
Start: 2021-09-20 | End: 2021-09-23

## 2021-09-20 RX ORDER — GABAPENTIN 300 MG/1
300 CAPSULE ORAL 3 TIMES DAILY
Qty: 90 CAPSULE | Refills: 5 | Status: SHIPPED | OUTPATIENT
Start: 2021-09-20 | End: 2024-03-25

## 2021-09-20 RX ORDER — SOLIFENACIN SUCCINATE 10 MG/1
10 TABLET, FILM COATED ORAL DAILY
Qty: 90 TABLET | Refills: 1 | Status: SHIPPED | OUTPATIENT
Start: 2021-09-20

## 2021-09-21 LAB
ALBUMIN SERPL BCP-MCNC: 4 G/DL (ref 3.5–5.2)
ALP SERPL-CCNC: 61 U/L (ref 55–135)
ALT SERPL W/O P-5'-P-CCNC: 8 U/L (ref 10–44)
ANION GAP SERPL CALC-SCNC: 17 MMOL/L (ref 8–16)
AST SERPL-CCNC: 13 U/L (ref 10–40)
BILIRUB SERPL-MCNC: 0.3 MG/DL (ref 0.1–1)
BUN SERPL-MCNC: 27 MG/DL (ref 8–23)
CALCIUM SERPL-MCNC: 10.1 MG/DL (ref 8.7–10.5)
CHLORIDE SERPL-SCNC: 103 MMOL/L (ref 95–110)
CO2 SERPL-SCNC: 21 MMOL/L (ref 23–29)
CREAT SERPL-MCNC: 1.1 MG/DL (ref 0.5–1.4)
ERYTHROCYTE [DISTWIDTH] IN BLOOD BY AUTOMATED COUNT: 14.6 % (ref 11.5–14.5)
EST. GFR  (AFRICAN AMERICAN): 59.2 ML/MIN/1.73 M^2
EST. GFR  (NON AFRICAN AMERICAN): 51.3 ML/MIN/1.73 M^2
GLUCOSE SERPL-MCNC: 154 MG/DL (ref 70–110)
HCT VFR BLD AUTO: 44.1 % (ref 37–48.5)
HGB BLD-MCNC: 13.4 G/DL (ref 12–16)
MCH RBC QN AUTO: 25.9 PG (ref 27–31)
MCHC RBC AUTO-ENTMCNC: 30.4 G/DL (ref 32–36)
MCV RBC AUTO: 85 FL (ref 82–98)
PLATELET # BLD AUTO: 385 K/UL (ref 150–450)
PMV BLD AUTO: 10.1 FL (ref 9.2–12.9)
POTASSIUM SERPL-SCNC: 4.2 MMOL/L (ref 3.5–5.1)
PROT SERPL-MCNC: 7.8 G/DL (ref 6–8.4)
RBC # BLD AUTO: 5.17 M/UL (ref 4–5.4)
SODIUM SERPL-SCNC: 141 MMOL/L (ref 136–145)
WBC # BLD AUTO: 10.43 K/UL (ref 3.9–12.7)

## 2021-09-24 ENCOUNTER — TELEPHONE (OUTPATIENT)
Dept: FAMILY MEDICINE | Facility: CLINIC | Age: 71
End: 2021-09-24

## 2021-09-26 LAB — BACTERIA UR CULT: ABNORMAL

## 2021-09-27 ENCOUNTER — LAB VISIT (OUTPATIENT)
Dept: LAB | Facility: HOSPITAL | Age: 71
End: 2021-09-27
Attending: INTERNAL MEDICINE
Payer: MEDICARE

## 2021-09-27 ENCOUNTER — TELEPHONE (OUTPATIENT)
Dept: FAMILY MEDICINE | Facility: CLINIC | Age: 71
End: 2021-09-27

## 2021-09-27 DIAGNOSIS — E11.42 TYPE 2 DIABETES MELLITUS WITH DIABETIC POLYNEUROPATHY, WITH LONG-TERM CURRENT USE OF INSULIN: ICD-10-CM

## 2021-09-27 DIAGNOSIS — Z79.4 TYPE 2 DIABETES MELLITUS WITH DIABETIC POLYNEUROPATHY, WITH LONG-TERM CURRENT USE OF INSULIN: ICD-10-CM

## 2021-09-27 PROCEDURE — 36415 COLL VENOUS BLD VENIPUNCTURE: CPT | Mod: PO | Performed by: INTERNAL MEDICINE

## 2021-09-27 PROCEDURE — 80053 COMPREHEN METABOLIC PANEL: CPT | Performed by: INTERNAL MEDICINE

## 2021-09-27 PROCEDURE — 85027 COMPLETE CBC AUTOMATED: CPT | Performed by: INTERNAL MEDICINE

## 2021-09-28 ENCOUNTER — TELEPHONE (OUTPATIENT)
Dept: FAMILY MEDICINE | Facility: CLINIC | Age: 71
End: 2021-09-28

## 2021-09-28 LAB
ALBUMIN SERPL BCP-MCNC: 3.8 G/DL (ref 3.5–5.2)
ALP SERPL-CCNC: 57 U/L (ref 55–135)
ALT SERPL W/O P-5'-P-CCNC: 11 U/L (ref 10–44)
ANION GAP SERPL CALC-SCNC: 10 MMOL/L (ref 8–16)
AST SERPL-CCNC: 15 U/L (ref 10–40)
BILIRUB SERPL-MCNC: 0.2 MG/DL (ref 0.1–1)
BUN SERPL-MCNC: 27 MG/DL (ref 8–23)
CALCIUM SERPL-MCNC: 10.2 MG/DL (ref 8.7–10.5)
CHLORIDE SERPL-SCNC: 106 MMOL/L (ref 95–110)
CO2 SERPL-SCNC: 24 MMOL/L (ref 23–29)
CREAT SERPL-MCNC: 1.5 MG/DL (ref 0.5–1.4)
ERYTHROCYTE [DISTWIDTH] IN BLOOD BY AUTOMATED COUNT: 15.3 % (ref 11.5–14.5)
EST. GFR  (AFRICAN AMERICAN): 40.7 ML/MIN/1.73 M^2
EST. GFR  (NON AFRICAN AMERICAN): 35.3 ML/MIN/1.73 M^2
GLUCOSE SERPL-MCNC: 174 MG/DL (ref 70–110)
HCT VFR BLD AUTO: 40.3 % (ref 37–48.5)
HGB BLD-MCNC: 12.1 G/DL (ref 12–16)
MCH RBC QN AUTO: 25.6 PG (ref 27–31)
MCHC RBC AUTO-ENTMCNC: 30 G/DL (ref 32–36)
MCV RBC AUTO: 85 FL (ref 82–98)
PLATELET # BLD AUTO: 370 K/UL (ref 150–450)
PMV BLD AUTO: 10.2 FL (ref 9.2–12.9)
POTASSIUM SERPL-SCNC: 4.7 MMOL/L (ref 3.5–5.1)
PROT SERPL-MCNC: 7.6 G/DL (ref 6–8.4)
RBC # BLD AUTO: 4.73 M/UL (ref 4–5.4)
SODIUM SERPL-SCNC: 140 MMOL/L (ref 136–145)
WBC # BLD AUTO: 9.01 K/UL (ref 3.9–12.7)

## 2021-09-29 ENCOUNTER — TELEPHONE (OUTPATIENT)
Dept: FAMILY MEDICINE | Facility: CLINIC | Age: 71
End: 2021-09-29

## 2021-10-06 ENCOUNTER — TELEPHONE (OUTPATIENT)
Dept: ADMINISTRATIVE | Facility: HOSPITAL | Age: 71
End: 2021-10-06

## 2021-10-07 ENCOUNTER — HOSPITAL ENCOUNTER (OUTPATIENT)
Dept: RADIOLOGY | Facility: HOSPITAL | Age: 71
Discharge: HOME | End: 2021-10-07
Attending: INTERNAL MEDICINE
Payer: MEDICARE

## 2021-10-07 DIAGNOSIS — I10 ESSENTIAL HYPERTENSION: ICD-10-CM

## 2021-10-07 DIAGNOSIS — I65.23 BILATERAL CAROTID ARTERY STENOSIS: ICD-10-CM

## 2021-10-07 DIAGNOSIS — E11.42 TYPE 2 DIABETES MELLITUS WITH DIABETIC POLYNEUROPATHY, WITH LONG-TERM CURRENT USE OF INSULIN: ICD-10-CM

## 2021-10-07 DIAGNOSIS — Z79.4 TYPE 2 DIABETES MELLITUS WITH DIABETIC POLYNEUROPATHY, WITH LONG-TERM CURRENT USE OF INSULIN: ICD-10-CM

## 2021-10-07 PROCEDURE — 93880 EXTRACRANIAL BILAT STUDY: CPT | Mod: 26,,, | Performed by: RADIOLOGY

## 2021-10-07 PROCEDURE — 93880 EXTRACRANIAL BILAT STUDY: CPT | Mod: TC,PO

## 2021-10-07 PROCEDURE — 93880 US CAROTID BILATERAL: ICD-10-PCS | Mod: 26,,, | Performed by: RADIOLOGY

## 2021-10-07 RX ORDER — AMLODIPINE BESYLATE 10 MG/1
10 TABLET ORAL DAILY
Qty: 90 TABLET | Refills: 3 | Status: SHIPPED | OUTPATIENT
Start: 2021-10-07 | End: 2021-12-15 | Stop reason: SDUPTHER

## 2021-10-07 RX ORDER — ROSUVASTATIN CALCIUM 20 MG/1
20 TABLET, COATED ORAL DAILY
Qty: 90 TABLET | Refills: 3 | Status: SHIPPED | OUTPATIENT
Start: 2021-10-07 | End: 2024-03-25

## 2021-10-19 ENCOUNTER — TELEPHONE (OUTPATIENT)
Dept: FAMILY MEDICINE | Facility: CLINIC | Age: 71
End: 2021-10-19

## 2021-11-03 ENCOUNTER — PATIENT MESSAGE (OUTPATIENT)
Dept: ADMINISTRATIVE | Facility: HOSPITAL | Age: 71
End: 2021-11-03
Payer: MEDICARE

## 2021-12-15 ENCOUNTER — PATIENT MESSAGE (OUTPATIENT)
Dept: FAMILY MEDICINE | Facility: CLINIC | Age: 71
End: 2021-12-15
Payer: MEDICARE

## 2021-12-15 DIAGNOSIS — I10 ESSENTIAL HYPERTENSION: ICD-10-CM

## 2021-12-15 RX ORDER — AMLODIPINE BESYLATE 10 MG/1
10 TABLET ORAL DAILY
Qty: 90 TABLET | Refills: 3 | Status: SHIPPED | OUTPATIENT
Start: 2021-12-15

## 2021-12-29 ENCOUNTER — PATIENT OUTREACH (OUTPATIENT)
Dept: ADMINISTRATIVE | Facility: HOSPITAL | Age: 71
End: 2021-12-29
Payer: MEDICARE

## 2021-12-29 ENCOUNTER — PATIENT MESSAGE (OUTPATIENT)
Dept: ADMINISTRATIVE | Facility: HOSPITAL | Age: 71
End: 2021-12-29
Payer: MEDICARE

## 2022-02-02 ENCOUNTER — PATIENT MESSAGE (OUTPATIENT)
Dept: ADMINISTRATIVE | Facility: HOSPITAL | Age: 72
End: 2022-02-02

## 2022-02-16 DIAGNOSIS — E11.9 TYPE 2 DIABETES MELLITUS WITHOUT COMPLICATION: ICD-10-CM

## 2022-02-24 ENCOUNTER — PATIENT OUTREACH (OUTPATIENT)
Dept: ADMINISTRATIVE | Facility: HOSPITAL | Age: 72
End: 2022-02-24

## 2022-02-24 NOTE — PROGRESS NOTES
HM GAP: HA1c Report : Unable to find updated HA1c results in [facility], [facility], nor [facility]. Attempted to reach out to Pt about overdue blood sugar lab. Pt did not answer left voicemail with callback number. Last diabetic Portal msg unread as of today.

## 2022-03-13 ENCOUNTER — PATIENT MESSAGE (OUTPATIENT)
Dept: FAMILY MEDICINE | Facility: CLINIC | Age: 72
End: 2022-03-13

## 2022-03-24 ENCOUNTER — PATIENT OUTREACH (OUTPATIENT)
Dept: ADMINISTRATIVE | Facility: HOSPITAL | Age: 72
End: 2022-03-24

## 2022-03-24 NOTE — PROGRESS NOTES
Called Pt to schedule Diabetic labs, no answer, LVM. Portal msg sent by PCP not read as of today.

## 2022-04-14 ENCOUNTER — PATIENT OUTREACH (OUTPATIENT)
Dept: ADMINISTRATIVE | Facility: HOSPITAL | Age: 72
End: 2022-04-14
Payer: MEDICARE

## 2022-06-28 ENCOUNTER — HOSPITAL ENCOUNTER (EMERGENCY)
Facility: HOSPITAL | Age: 72
Discharge: HOME | End: 2022-06-28
Attending: EMERGENCY MEDICINE
Payer: MEDICARE

## 2022-06-28 VITALS
SYSTOLIC BLOOD PRESSURE: 143 MMHG | HEART RATE: 97 BPM | BODY MASS INDEX: 26.7 KG/M2 | WEIGHT: 136 LBS | TEMPERATURE: 98 F | RESPIRATION RATE: 19 BRPM | HEIGHT: 60 IN | OXYGEN SATURATION: 98 % | DIASTOLIC BLOOD PRESSURE: 87 MMHG

## 2022-06-28 DIAGNOSIS — R42 VERTIGO: Primary | ICD-10-CM

## 2022-06-28 DIAGNOSIS — R42 VERTIGO AS LATE EFFECT OF STROKE: ICD-10-CM

## 2022-06-28 DIAGNOSIS — I69.398 VERTIGO AS LATE EFFECT OF STROKE: ICD-10-CM

## 2022-06-28 PROCEDURE — 96372 THER/PROPH/DIAG INJ SC/IM: CPT | Performed by: EMERGENCY MEDICINE

## 2022-06-28 PROCEDURE — 25000003 PHARM REV CODE 250: Mod: ER | Performed by: EMERGENCY MEDICINE

## 2022-06-28 PROCEDURE — 63600175 PHARM REV CODE 636 W HCPCS: Mod: ER | Performed by: EMERGENCY MEDICINE

## 2022-06-28 PROCEDURE — 99284 EMERGENCY DEPT VISIT MOD MDM: CPT | Mod: ER

## 2022-06-28 RX ORDER — DIAZEPAM 5 MG/1
2.5 TABLET ORAL EVERY 8 HOURS PRN
Qty: 10 TABLET | Refills: 0 | Status: SHIPPED | OUTPATIENT
Start: 2022-06-28 | End: 2024-03-25

## 2022-06-28 RX ORDER — DIAZEPAM 10 MG/2ML
2 INJECTION INTRAMUSCULAR
Status: COMPLETED | OUTPATIENT
Start: 2022-06-28 | End: 2022-06-28

## 2022-06-28 RX ORDER — MECLIZINE HCL 12.5 MG 12.5 MG/1
25 TABLET ORAL
Status: COMPLETED | OUTPATIENT
Start: 2022-06-28 | End: 2022-06-28

## 2022-06-28 RX ORDER — DIAZEPAM 2 MG/1
2 TABLET ORAL
Status: DISCONTINUED | OUTPATIENT
Start: 2022-06-28 | End: 2022-06-28

## 2022-06-28 RX ORDER — MECLIZINE HYDROCHLORIDE 25 MG/1
25 TABLET ORAL EVERY 6 HOURS PRN
Qty: 120 TABLET | Refills: 0 | Status: SHIPPED | OUTPATIENT
Start: 2022-06-28

## 2022-06-28 RX ADMIN — DIAZEPAM 2 MG: 5 INJECTION, SOLUTION INTRAMUSCULAR; INTRAVENOUS at 03:06

## 2022-06-28 RX ADMIN — MECLIZINE 25 MG: 12.5 TABLET ORAL at 03:06

## 2022-06-28 NOTE — ED PROVIDER NOTES
Encounter Date: 6/28/2022       History     Chief Complaint   Patient presents with    Fall    Dizziness     PT reports dizziness x a couple years but it is worsening. PT reports she fell a couple days ago     The patient is a 72-year-old female with a history of vertigo presenting to the emergency department requesting a 2nd opinion.  She states that she has been dealing with vertigo for over 2 years.  She is an established patient at [facility].  She has been evaluated by neurology and ENT at that facility.  They tell her that her vertigo is secondary to crystals in her inner ear.  She is on chronic meclizine, taking 25 mg 3 times a day.  She is also on Valium as needed for the symptoms.  She is currently out of Valium and states that she would like some more.  Because of her dizziness, she did sustain a fall without trauma 2 days ago.  Over the past couple of years, she states that her vertigo is pretty much constant.  It causes her to be unable to stand or sit well.        Review of patient's allergies indicates:  No Known Allergies  Past Medical History:   Diagnosis Date    Anxiety     Bladder incontinence     Diabetes mellitus, type 2     History of CVA (cerebrovascular accident) 2015    Hypertension     Insomnia     Osteoarthritis      History reviewed. No pertinent surgical history.  Family History   Problem Relation Age of Onset    Diabetes Mother     Hypertension Mother     Coronary artery disease Mother         hx of cabg    Diabetes Father     Cancer Paternal Uncle         unknown    Heart failure Daughter      Social History     Tobacco Use    Smoking status: Heavy Tobacco Smoker     Packs/day: 1.00     Years: 0.00     Pack years: 0.00     Types: Cigarettes    Smokeless tobacco: Never Used   Substance Use Topics    Alcohol use: Not Currently     Alcohol/week: 0.0 standard drinks    Drug use: Not Currently     Review of Systems  General: Denies fever.  Denies  chills.  Denies generalized weakness.  HENT: Denies sore throat.  Denies earache.  Denies rhinorrhea.  Denies ringing in her years.  Reports vertigo.  Eyes: Denies visual changes.  Denies eye pain.  Denies drainage.  Cardiovascular: Denies chest pain.  Denies shortness of breath.  Denies orthopnea.  Denies dyspnea on exertion.  Denies palpitations.  Respiratory: Denies shortness of breath.  Denies wheezing.  Denies coughing.  GI: Denies abdominal pain.  Denies nausea.  Denies vomiting.  Denies diarrhea.  Denies constipation.  Denies melena.  Denies hematochezia.  : Denies dysuria.  Denies hematuria.  Denies pelvic pain.  Denies swelling.  Skin: Denies rashes.  Denies lesions.  Denies pallor.  Neuro: Denies headache.  Denies head trauma.  Denies numbness.  Denies focal weakness.  Reports vertigo.  Musculoskeletal: Denies neck pain.  Denies back pain.  Denies extremity pain.  Denies extremity swelling.        Physical Exam     Initial Vitals [06/28/22 1441]   BP Pulse Resp Temp SpO2   138/81 108 (!) 22 98.4 °F (36.9 °C) 96 %      MAP       --         Physical Exam  General:  Mild distress with dizziness.  Well-nourished.  Well-developed.  Alert and oriented x3.  HENT: Moist mucous membranes.  Normocephalic atraumatic.  Oropharynx clear.  Tympanic membranes clear bilaterally  Eyes: Pupils equally round and reactive to light.  Extraocular movements intact.  No scleral icterus.  No conjunctival pallor.  Cardiovascular: Regular rate and rhythm.  No murmurs, rubs, or gallops.  Brisk capillary fill.  2+ distal pulses.  Respiratory: Clear to auscultation bilaterally.  No wheezes, rales, or rhonchi.  No respiratory distress.  Abdomen: Soft.  Nontender.  Nondistended.  No guarding.  No rebound.  No masses.  No abdominal bruit auscultated.  Skin: No rashes.  No lesions.  No pallor.  No jaundice.  Neuro: Cranial nerves II through XII grossly intact.  Moving all extremities equally.  No sensory deficits.  Strength 5 out of 5 in  all 4 extremities.  Normal finger-to-nose.  No pronator drift.  Broad-based gait.  Unsteady gait noted.  Musculoskeletal: Neck supple.  No extremity tenderness.  Moving all extremities without pain.  No back tenderness.  No neck tenderness.        ED Course   Procedures  Labs Reviewed - No data to display       Imaging Results    None          Medications   meclizine tablet 25 mg (has no administration in time range)   diazePAM injection 2 mg (has no administration in time range)     Medical Decision Making:   Initial Assessment:   This is an emergent evaluation.  According to the patient, she has had an extensive workup and is being treated for chronic vertigo.  She is requesting a 2nd opinion with an ENT and a neurologist.  The family and the patient understand that this is a free-standing emergency department without resources.  They are requesting outpatient referrals.  I will provide the patient with meclizine and Valium here in the emergency department.  I will also provide the patient with a prescription for Valium and meclizine.  The patient appears happy with this disposition.  She has been urged to follow up with her primary care physician as well.                      Clinical Impression:   Final diagnoses:  [R42] Vertigo (Primary)  [I69.398, R42] Vertigo as late effect of stroke          ED Disposition Condition    Discharge Stable        ED Prescriptions     Medication Sig Dispense Start Date End Date Auth. Provider    diazePAM (VALIUM) 5 MG tablet Take 0.5 tablets (2.5 mg total) by mouth every 8 (eight) hours as needed (Vertigo refractory to meclizine). 10 tablet 6/28/2022  Moo Snyder MD    meclizine (ANTIVERT) 25 mg tablet Take 1 tablet (25 mg total) by mouth every 6 (six) hours as needed for Dizziness. 120 tablet 6/28/2022  Moo Snyder MD        Follow-up Information     Follow up With Specialties Details Why Contact Info Additional Information    [facility] Th[facility] Otolaryngology In 2  days  [address], [address]  [facility] [address] [phone] Please park in Lot C or D and use Amanda ambriz. [facility] Medical Office Bldg. elevators.    [facility] - Neurology Neurology In 2 days  [address], [address]  [facility] [address] [phone]     [facility] - Internal Medicine Internal Medicine In 2 days  [address], [address]  [facility] [address] [phone] Please park in surface lot and check in at [address].           Moo Snyder MD  06/28/22 2489

## 2022-06-28 NOTE — ED NOTES
Review of patient's allergies indicates:  No Known Allergies     Patient has verified the spelling of the name and  on armband.   APPEARANCE: Patient is alert, calm, oriented x 4, and does not appear distressed.  SKIN: Skin is normal for race, warm, and dry. Normal skin turgor and mucous membranes moist.  CARDIAC: Normal rate and rhythm, no murmur heard.   RESPIRATORY:Normal rate and effort. Breath sounds clear bilaterally throughout chest. Respirations are equal and unlabored.    GASTRO: Bowel sounds normal, abdomen is soft, no tenderness, and no abdominal distention.  MUSCLE: Full ROM. No bony tenderness or soft tissue tenderness. No obvious deformity. Uses a rolling walker for ambulation.   PERIPHERAL VASCULAR: peripheral pulses present. Normal cap refill. No edema. Warm to touch.  NEURO: 5/5 strength major flexors/extensors bilaterally. Sensory intact to light touch bilaterally. Manuela coma scale: eyes open spontaneously-4, oriented & converses-5, obeys commands-6. C/o dizziness that has been affecting her life for about 2yrs.   MENTAL STATUS: awake, alert and aware of environment.  EYE: No overt deficits noted. No drainage. Sclera WNL  ENT: EARS: no obvious drainage. NOSE: no active bleeding. THROAT: no redness or swelling.   : Voids without complication

## 2022-06-30 ENCOUNTER — TELEPHONE (OUTPATIENT)
Dept: ADMINISTRATIVE | Facility: OTHER | Age: 72
End: 2022-06-30
Payer: MEDICARE

## 2022-07-11 ENCOUNTER — TELEPHONE (OUTPATIENT)
Dept: OTOLARYNGOLOGY | Facility: CLINIC | Age: 72
End: 2022-07-11
Payer: MEDICARE

## 2022-07-11 NOTE — TELEPHONE ENCOUNTER
Attempted to reach patient twice on this number. VM full and unable to reschedule. Provider will be out of the office.

## 2022-12-21 ENCOUNTER — PES CALL (OUTPATIENT)
Dept: ADMINISTRATIVE | Facility: OTHER | Age: 72
End: 2022-12-21
Payer: MEDICARE

## 2024-03-25 ENCOUNTER — HOSPITAL ENCOUNTER (INPATIENT)
Facility: HOSPITAL | Age: 74
LOS: 3 days | Discharge: HOME | DRG: 482 | End: 2024-03-28
Attending: STUDENT IN AN ORGANIZED HEALTH CARE EDUCATION/TRAINING PROGRAM | Admitting: STUDENT IN AN ORGANIZED HEALTH CARE EDUCATION/TRAINING PROGRAM
Payer: MEDICARE

## 2024-03-25 DIAGNOSIS — R55 SYNCOPE: ICD-10-CM
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
## 2024-03-25 DIAGNOSIS — Z01.818 PRE-OP EVALUATION: ICD-10-CM

## 2024-03-25 DIAGNOSIS — S72.002A CLOSED FRACTURE OF NECK OF LEFT FEMUR, INITIAL ENCOUNTER: Primary | ICD-10-CM

## 2024-03-25 DIAGNOSIS — R55 SYNCOPE AND COLLAPSE: ICD-10-CM

## 2024-03-25 PROBLEM — E78.5 HYPERLIPEMIA: Status: ACTIVE | Noted: 2024-03-25

## 2024-03-25 LAB
ALBUMIN SERPL BCP-MCNC: 4.5 G/DL (ref 3.5–5.2)
ALP SERPL-CCNC: 70 U/L (ref 38–126)
ALT SERPL W/O P-5'-P-CCNC: 9 U/L (ref 10–44)
ANION GAP SERPL CALC-SCNC: 11 MMOL/L (ref 8–16)
AST SERPL-CCNC: 17 U/L (ref 15–46)
BACTERIA #/AREA URNS HPF: ABNORMAL /HPF
BASOPHILS # BLD AUTO: 0.06 K/UL (ref 0–0.2)
BASOPHILS NFR BLD: 0.8 % (ref 0–1.9)
BILIRUB SERPL-MCNC: 0.4 MG/DL (ref 0.1–1)
BILIRUB UR QL STRIP: NEGATIVE
CALCIUM SERPL-MCNC: 9.9 MG/DL (ref 8.7–10.5)
CHLORIDE SERPL-SCNC: 106 MMOL/L (ref 95–110)
CHOLEST SERPL-MCNC: 215 MG/DL (ref 120–199)
CHOLEST/HDLC SERPL: 4.2 {RATIO} (ref 2–5)
CLARITY UR: ABNORMAL
CO2 SERPL-SCNC: 27 MMOL/L (ref 23–29)
COLOR UR: YELLOW
CREAT SERPL-MCNC: 1.36 MG/DL (ref 0.5–1.4)
DIFFERENTIAL METHOD BLD: ABNORMAL
EOSINOPHIL # BLD AUTO: 0.1 K/UL (ref 0–0.5)
EOSINOPHIL NFR BLD: 1.8 % (ref 0–8)
ERYTHROCYTE [DISTWIDTH] IN BLOOD BY AUTOMATED COUNT: 13.1 % (ref 11.5–14.5)
EST. GFR  (NO RACE VARIABLE): 41.6 ML/MIN/1.73 M^2
ESTIMATED AVG GLUCOSE: 120 MG/DL (ref 68–131)
GLUCOSE SERPL-MCNC: 104 MG/DL (ref 70–110)
GLUCOSE UR QL STRIP: NEGATIVE
HBA1C MFR BLD: 5.8 % (ref 4–5.6)
HCT VFR BLD AUTO: 40.7 % (ref 37–48.5)
HDLC SERPL-MCNC: 51 MG/DL (ref 40–75)
HDLC SERPL: 23.7 % (ref 20–50)
HGB BLD-MCNC: 13 G/DL (ref 12–16)
HGB UR QL STRIP: ABNORMAL
HYALINE CASTS #/AREA URNS LPF: 0 /LPF
IMM GRANULOCYTES # BLD AUTO: 0.01 K/UL (ref 0–0.04)
IMM GRANULOCYTES NFR BLD AUTO: 0.1 % (ref 0–0.5)
KETONES UR QL STRIP: NEGATIVE
LACTATE SERPL-SCNC: 1 MMOL/L (ref 0.5–2.2)
LDLC SERPL CALC-MCNC: 138.8 MG/DL (ref 63–159)
LEUKOCYTE ESTERASE UR QL STRIP: ABNORMAL
LYMPHOCYTES # BLD AUTO: 2.5 K/UL (ref 1–4.8)
LYMPHOCYTES NFR BLD: 32.5 % (ref 18–48)
MCH RBC QN AUTO: 27.5 PG (ref 27–31)
MCHC RBC AUTO-ENTMCNC: 31.9 G/DL (ref 32–36)
MCV RBC AUTO: 86 FL (ref 82–98)
MICROSCOPIC COMMENT: ABNORMAL
MONOCYTES # BLD AUTO: 0.6 K/UL (ref 0.3–1)
MONOCYTES NFR BLD: 7.6 % (ref 4–15)
NEUTROPHILS # BLD AUTO: 4.4 K/UL (ref 1.8–7.7)
NEUTROPHILS NFR BLD: 57.2 % (ref 38–73)
NITRITE UR QL STRIP: NEGATIVE
NONHDLC SERPL-MCNC: 164 MG/DL
NRBC BLD-RTO: 0 /100 WBC
PH UR STRIP: 8 [PH] (ref 5–8)
PLATELET # BLD AUTO: 376 K/UL (ref 150–450)
PMV BLD AUTO: 10.1 FL (ref 9.2–12.9)
POCT GLUCOSE: 149 MG/DL (ref 70–110)
POTASSIUM SERPL-SCNC: 3.7 MMOL/L (ref 3.5–5.1)
PROT SERPL-MCNC: 8.5 G/DL (ref 6–8.4)
PROT UR QL STRIP: ABNORMAL
RBC # BLD AUTO: 4.73 M/UL (ref 4–5.4)
RBC #/AREA URNS HPF: 8 /HPF (ref 0–4)
SODIUM SERPL-SCNC: 144 MMOL/L (ref 136–145)
SP GR UR STRIP: 1.02 (ref 1–1.03)
SQUAMOUS #/AREA URNS HPF: 5 /HPF
TRI-PHOS CRY URNS QL MICRO: ABNORMAL
TRIGL SERPL-MCNC: 126 MG/DL (ref 30–150)
TROPONIN I SERPL DL<=0.01 NG/ML-MCNC: <0.006 NG/ML (ref 0–0.03)
URN SPEC COLLECT METH UR: ABNORMAL
UROBILINOGEN UR STRIP-ACNC: NEGATIVE EU/DL
UUN UR-MCNC: 32 MG/DL (ref 7–17)
WBC # BLD AUTO: 7.75 K/UL (ref 3.9–12.7)
WBC #/AREA URNS HPF: >100 /HPF (ref 0–5)

## 2024-03-25 PROCEDURE — 87077 CULTURE AEROBIC IDENTIFY: CPT

## 2024-03-25 PROCEDURE — 85025 COMPLETE CBC W/AUTO DIFF WBC: CPT | Mod: ER

## 2024-03-25 PROCEDURE — 84484 ASSAY OF TROPONIN QUANT: CPT

## 2024-03-25 PROCEDURE — 93010 ELECTROCARDIOGRAM REPORT: CPT | Mod: ,,, | Performed by: INTERNAL MEDICINE

## 2024-03-25 PROCEDURE — 93005 ELECTROCARDIOGRAM TRACING: CPT | Mod: ER

## 2024-03-25 PROCEDURE — 87086 URINE CULTURE/COLONY COUNT: CPT

## 2024-03-25 PROCEDURE — 83036 HEMOGLOBIN GLYCOSYLATED A1C: CPT

## 2024-03-25 PROCEDURE — 81000 URINALYSIS NONAUTO W/SCOPE: CPT

## 2024-03-25 PROCEDURE — 25000003 PHARM REV CODE 250: Mod: ER

## 2024-03-25 PROCEDURE — 99900035 HC TECH TIME PER 15 MIN (STAT): Mod: ER

## 2024-03-25 PROCEDURE — 11000001 HC ACUTE MED/SURG PRIVATE ROOM

## 2024-03-25 PROCEDURE — 80053 COMPREHEN METABOLIC PANEL: CPT | Mod: ER

## 2024-03-25 PROCEDURE — 80061 LIPID PANEL: CPT

## 2024-03-25 PROCEDURE — 87186 SC STD MICRODIL/AGAR DIL: CPT

## 2024-03-25 PROCEDURE — 36415 COLL VENOUS BLD VENIPUNCTURE: CPT

## 2024-03-25 PROCEDURE — 87088 URINE BACTERIA CULTURE: CPT

## 2024-03-25 PROCEDURE — 99285 EMERGENCY DEPT VISIT HI MDM: CPT | Mod: 25,ER

## 2024-03-25 PROCEDURE — 83605 ASSAY OF LACTIC ACID: CPT

## 2024-03-25 RX ORDER — LOSARTAN POTASSIUM AND HYDROCHLOROTHIAZIDE 12.5; 5 MG/1; MG/1
1 TABLET ORAL DAILY
COMMUNITY
Start: 2024-02-16

## 2024-03-25 RX ORDER — AMOXICILLIN 250 MG
1 CAPSULE ORAL 2 TIMES DAILY PRN
Status: DISCONTINUED | OUTPATIENT
Start: 2024-03-25 | End: 2024-03-28 | Stop reason: HOSPADM

## 2024-03-25 RX ORDER — ACETAMINOPHEN 325 MG/1
650 TABLET ORAL
Status: COMPLETED | OUTPATIENT
Start: 2024-03-25 | End: 2024-03-25

## 2024-03-25 RX ORDER — TALC
6 POWDER (GRAM) TOPICAL NIGHTLY PRN
Status: DISCONTINUED | OUTPATIENT
Start: 2024-03-25 | End: 2024-03-28 | Stop reason: HOSPADM

## 2024-03-25 RX ORDER — IBUPROFEN 200 MG
24 TABLET ORAL
Status: DISCONTINUED | OUTPATIENT
Start: 2024-03-25 | End: 2024-03-28 | Stop reason: HOSPADM

## 2024-03-25 RX ORDER — AMLODIPINE BESYLATE AND ATORVASTATIN CALCIUM 10; 10 MG/1; MG/1
1 TABLET, FILM COATED ORAL DAILY
COMMUNITY
Start: 2023-12-13 | End: 2024-03-25

## 2024-03-25 RX ORDER — ONDANSETRON HYDROCHLORIDE 2 MG/ML
4 INJECTION, SOLUTION INTRAVENOUS EVERY 8 HOURS PRN
Status: DISCONTINUED | OUTPATIENT
Start: 2024-03-25 | End: 2024-03-28 | Stop reason: HOSPADM

## 2024-03-25 RX ORDER — SITAGLIPTIN 100 MG/1
100 TABLET, FILM COATED ORAL DAILY
COMMUNITY

## 2024-03-25 RX ORDER — AMLODIPINE BESYLATE 5 MG/1
10 TABLET ORAL DAILY
Status: DISCONTINUED | OUTPATIENT
Start: 2024-03-26 | End: 2024-03-28 | Stop reason: HOSPADM

## 2024-03-25 RX ORDER — SODIUM CHLORIDE 0.9 % (FLUSH) 0.9 %
5 SYRINGE (ML) INJECTION
Status: DISCONTINUED | OUTPATIENT
Start: 2024-03-25 | End: 2024-03-28 | Stop reason: HOSPADM

## 2024-03-25 RX ORDER — GLUCAGON 1 MG
1 KIT INJECTION
Status: DISCONTINUED | OUTPATIENT
Start: 2024-03-25 | End: 2024-03-28 | Stop reason: HOSPADM

## 2024-03-25 RX ORDER — ERGOCALCIFEROL 1.25 MG/1
50000 CAPSULE ORAL
COMMUNITY
Start: 2024-02-21

## 2024-03-25 RX ORDER — IBUPROFEN 200 MG
16 TABLET ORAL
Status: DISCONTINUED | OUTPATIENT
Start: 2024-03-25 | End: 2024-03-28 | Stop reason: HOSPADM

## 2024-03-25 RX ORDER — NALOXONE HCL 0.4 MG/ML
0.02 VIAL (ML) INJECTION
Status: DISCONTINUED | OUTPATIENT
Start: 2024-03-25 | End: 2024-03-28 | Stop reason: HOSPADM

## 2024-03-25 RX ORDER — ACETAMINOPHEN 325 MG/1
650 TABLET ORAL EVERY 4 HOURS PRN
Status: DISCONTINUED | OUTPATIENT
Start: 2024-03-25 | End: 2024-03-28 | Stop reason: HOSPADM

## 2024-03-25 RX ORDER — INSULIN ASPART 100 [IU]/ML
1-10 INJECTION, SOLUTION INTRAVENOUS; SUBCUTANEOUS
Status: DISCONTINUED | OUTPATIENT
Start: 2024-03-25 | End: 2024-03-28 | Stop reason: HOSPADM

## 2024-03-25 RX ADMIN — ACETAMINOPHEN 650 MG: 325 TABLET ORAL at 03:03

## 2024-03-25 NOTE — Clinical Note
Diagnosis: Closed fracture of neck of left femur, initial encounter [[id number]]   Future Attending Provider: ANTHONY MICHAEL [[id number]]   Reason for IP Medical Treatment  (Clinical interventions that can only be accomplished in the IP setting? ) :: femoral neck fracture   I certify that Inpatient services for greater than or equal to 2 midnights are medically necessary:: Yes   Plans for Post-Acute care--if anticipated (pick the single best option):: C. Discharge home with home health services

## 2024-03-25 NOTE — PHARMACY MED REC
"[facility]           Pharmacy  Admission Medication History     The home medication history was taken by Jessa Duran.      Medication history obtained from Medications listed below were obtained from: Patient/family    Based on information gathered for medication list, you may go to "Admission" then "Reconcile Home Medications" tabs to review and/or act upon those items.     The home medication list has been updated by the Pharmacy department.   Please read ALL comments highlighted in yellow.   Please address this information as you see fit.    Feel free to contact us if you have any questions or require assistance.    The medications listed below were removed from the home medication list.  Please reorder if appropriate:    Patient reports NOT TAKING the following medication(s):  Aspirin 81mg  Valium 5mg  Gabapentin 300mg  Lantus solostar  Crestor 20mg  Trazodone 50mg  Kenalog cream  Effexor xr 75mg  Caduet 10-10mg  Potential issues to be addressed PRIOR TO DISCHARGE  Patient reported not taking the following medications: (norvasc 10mg). These medications remain on the home medication list. Please address accordingly.     No current facility-administered medications on file prior to encounter.     Current Outpatient Medications on File Prior to Encounter   Medication Sig Dispense Refill    ergocalciferol (ERGOCALCIFEROL) 50,000 unit Cap Take 50,000 Units by mouth every 7 days.      JANUVIA 100 mg Tab Take 100 mg by mouth once daily.      losartan-hydrochlorothiazide 50-12.5 mg (HYZAAR) 50-12.5 mg per tablet Take 1 tablet by mouth once daily.      meclizine (ANTIVERT) 25 mg tablet Take 1 tablet (25 mg total) by mouth every 6 (six) hours as needed for Dizziness. (Patient taking differently: Take 25 mg by mouth 3 (three) times daily as needed for Dizziness.) 120 tablet 0    solifenacin (VESICARE) 10 MG tablet Take 1 tablet (10 mg total) by mouth once daily. 90 tablet 1    blood sugar " "diagnostic (BLOOD GLUCOSE TEST) Strp Use 1-2 x a day to check glucoses before meals. 100 strip 11    blood-glucose meter kit Use as instructed 1 each 0    lancets Misc 1 Units by Misc.(Non-Drug; Combo Route) route daily as needed. 100 each 11    pen needle, diabetic 31 gauge x 1/4" Ndle Use to check insulin twice daily 100 each 11       Please address this information as you see fit.  Feel free to contact us if you have any questions or require assistance.    Jessa Duran  [phone]                  .          "

## 2024-03-25 NOTE — ED PROVIDER NOTES
Encounter Date: 3/25/2024       History     Chief Complaint   Patient presents with    hip pain s/p fall     Pt arrived via EMS states she fell 5 days ago while she was in her kitchen on her tile floor and has left hip pain. Pt states she has been walking on it ever since but pain worse today. No obv deformity.      Elda Yoder is a 73 y.o. female who has a past medical history of Anxiety, Bladder incontinence, Diabetes mellitus, type 2, History of CVA (cerebrovascular accident), Hypertension, Insomnia, and Osteoarthritis presenting to the Emergency Department for left hip pain.  She reports a mechanical slip and fall on kitchen tile 5 days ago while standing in the kitchen. She denies any symptoms preceding the fall. She landed on her left hip.  Since that time she has been having increased pain and difficulty ambulating, pain more noticeable this morning. She denies any numbness or tingling.  No radiculopathy.  No head trauma or LOC.  Takes aspirin daily.  No other thinning agents.  She has tried Tylenol at home for the pain with only mild relief. She reports pain is tolerable at rest.         The history is provided by the patient.     Review of patient's allergies indicates:  No Known Allergies  Past Medical History:   Diagnosis Date    Anxiety     Bladder incontinence     Diabetes mellitus, type 2     History of CVA (cerebrovascular accident) 2015    Hypertension     Insomnia     Osteoarthritis      No past surgical history on file.  Family History   Problem Relation Age of Onset    Diabetes Mother     Hypertension Mother     Coronary artery disease Mother         hx of cabg    Diabetes Father     Cancer Paternal Uncle         unknown    Heart failure Daughter      Social History     Tobacco Use    Smoking status: Heavy Smoker     Current packs/day: 1.00     Types: Cigarettes    Smokeless tobacco: Never   Substance Use Topics    Alcohol use: Not Currently     Alcohol/week: 0.0 standard drinks of alcohol     Drug use: Not Currently     Review of Systems   Constitutional:  Negative for chills and fever.   Musculoskeletal:  Positive for arthralgias and gait problem.   Skin:  Negative for color change, rash and wound.   Psychiatric/Behavioral:  Negative for agitation and confusion.        Physical Exam     Initial Vitals [03/25/24 1302]   BP Pulse Resp Temp SpO2   (!) 156/84 87 17 98.5 °F (36.9 °C) 100 %      MAP       --         Physical Exam    Nursing note and vitals reviewed.  Constitutional: She appears well-developed and well-nourished. She is not diaphoretic.  Non-toxic appearance. No distress.   HENT:   Head: Normocephalic and atraumatic.   Right Ear: Hearing and external ear normal.   Left Ear: Hearing and external ear normal.   Eyes: EOM are normal.   Neck: Neck supple.   Normal range of motion.  Cardiovascular:  Normal rate.           Pulmonary/Chest: No respiratory distress.   Abdominal: She exhibits no distension.   Musculoskeletal:      Cervical back: Normal range of motion and neck supple. Normal range of motion.      Comments: No leg length discrepancy or rotation.  Full active range of motion about left hip with some increase in pain.  Pain with passive internal rotation.  No edema, erythema, color change.  2+ DP pulses.  Brisk capillary refill.     Neurological: She is alert and oriented to person, place, and time. GCS score is 15. GCS eye subscore is 4. GCS verbal subscore is 5. GCS motor subscore is 6.   Skin: Skin is warm and dry.   Psychiatric: She has a normal mood and affect. Her behavior is normal. Judgment and thought content normal.         ED Course   Procedures  Labs Reviewed - No data to display       Imaging Results              CT Hip Without Contrast Left (Final result)  Result time 03/25/24 15:19:57      Final result by Frank Duran MD (03/25/24 15:19:57)                   Impression:      Nondisplaced subcapital left femoral neck fracture.    All CT scans at this facility are  performed  using dose modulation techniques as appropriate to performed exam including the following:  automated exposure control; adjustment of mA and/or kV according to the patients size (this includes techniques or standardized protocols for targeted exams where dose is matched to indication/reason for exam: i.e. extremities or head);  iterative reconstruction technique.      Electronically signed by: Frank Duran MD  Date:    03/25/2024  Time:    15:19               Narrative:    EXAMINATION:  CT HIP WITHOUT CONTRAST LEFT    CLINICAL HISTORY:  Fracture, hip;    TECHNIQUE:  Axial CT through the pelvis and left hip with multiplanar reformations.    COMPARISON:  Earlier radiographs    FINDINGS:  Fracture through the subcapital left femoral neck.  Mild degree of impaction.  No significant offset.    No additional fractures.    No advanced arthritic change of the hips.  Osteopenia.  Advanced arterial vascular calcifications.                                       X-Ray Hip 2 or 3 views Left (with Pelvis when performed) (Final result)  Result time 03/25/24 14:11:12      Final result by Frank Duran MD (03/25/24 14:11:12)                   Impression:      Suspected subcapital left femoral neck fracture.  Correlation with CT recommended.      Electronically signed by: Frank Duran MD  Date:    03/25/2024  Time:    14:11               Narrative:    EXAMINATION:  XR HIP WITH PELVIS WHEN PERFORMED, 2 OR 3 VIEWS LEFT    CLINICAL HISTORY:  Unspecified fall, initial encounter    TECHNIQUE:  AP pelvis plus two views left hip.    COMPARISON:  None    FINDINGS:  Deformity and lucency left subcapital femoral neck.    No significant arthritic changes.    Osteopenia.    Advanced arterial vascular calcifications.                                       Medications   acetaminophen tablet 650 mg (650 mg Oral Given 3/25/24 1544)     Medical Decision Making  74yo female with left hip pain s/p mechanical fall 5 days ago. Presents via  EMS. No rotation or shortening. Pain with internal rotation. NVI. XR ordered.    Differential Diagnosis includes, but is not limited to:  Fracture, dislocation, compartment syndrome, nerve injury/palsy, vascular injury, DVT, rhabdomyolysis, hemarthrosis, septic joint, cellulitis, bursitis, muscle strain, ligament tear/sprain, laceration, foreign body, abrasion, soft tissue contusion, osteoarthritis.    Patient found to have nondisplaced subcapital left femoral neck fracture.  I did discuss the patient with ortho resident at [facility]. Patient was admitted to medicine for pre op clearance. NPO at midnight. Her pain has been controlled here with PO tylenol.    Amount and/or Complexity of Data Reviewed  Radiology: ordered. Decision-making details documented in ED Course.    Risk  OTC drugs.  Decision regarding hospitalization.              Attending Attestation:     Physician Attestation Statement for NP/PA:   I personally made/approved the management plan and take responsibility for the patient management.    Other NP/PA Attestation Additions:      Medical Decision Making: Agree with assessment and plan.             ED Course as of 03/25/24 1652   Mon Mar 25, 2024   1413 X-Ray Hip 2 or 3 views Left (with Pelvis when performed)  Suspected subcapital left femoral neck fracture.  Correlation with CT recommended. [CS]   1414 CT left hip ordered [CS]   1525 CT Hip Without Contrast Left  Nondisplaced subcapital left femoral neck fracture [CS]   1550 Discussing patient with ortho resident Dr. Lawrence. Transfer to [facility] for pre op clearance. NPO at midnight. [CS]   1555 Discussed patient with [facility] FP resident. Admit for pre op clearance. NPO at midnight. [CS]      ED Course User Index  [CS] Medina Hu, IVANIA                           Clinical Impression:  Final diagnoses:  [W19.XXXA] Fall  [S72.002A] Closed fracture of neck of left femur, initial encounter (Primary)          ED Disposition Condition    Admit  Medina Martinez PA-C  03/25/24 2175       Idris [facility] MD  03/25/24 9630

## 2024-03-25 NOTE — ED NOTES
Pt AAOx3, transferred from EMS stretcher to wheelchair without diff and escorted to ER waiting room.

## 2024-03-26 ENCOUNTER — ANESTHESIA (OUTPATIENT)
Dept: SURGERY | Facility: HOSPITAL | Age: 74
DRG: 482 | End: 2024-03-26
Payer: MEDICARE

## 2024-03-26 ENCOUNTER — ANESTHESIA EVENT (OUTPATIENT)
Dept: SURGERY | Facility: HOSPITAL | Age: 74
DRG: 482 | End: 2024-03-26
Payer: MEDICARE

## 2024-03-26 LAB
ALBUMIN SERPL BCP-MCNC: 3.7 G/DL (ref 3.5–5.2)
ALP SERPL-CCNC: 60 U/L (ref 55–135)
ALT SERPL W/O P-5'-P-CCNC: 7 U/L (ref 10–44)
ANION GAP SERPL CALC-SCNC: 11 MMOL/L (ref 8–16)
ASCENDING AORTA: 3.43 CM
AST SERPL-CCNC: 10 U/L (ref 10–40)
AV INDEX (PROSTH): 1.12
AV MEAN GRADIENT: 5 MMHG
AV PEAK GRADIENT: 8 MMHG
AV REGURGITATION PRESSURE HALF TIME: 583.59 MS
AV VALVE AREA BY VELOCITY RATIO: 3.95 CM²
AV VALVE AREA: 3.91 CM²
AV VELOCITY RATIO: 1.13
BASOPHILS # BLD AUTO: 0.08 K/UL (ref 0–0.2)
BASOPHILS NFR BLD: 1.2 % (ref 0–1.9)
BILIRUB SERPL-MCNC: 0.3 MG/DL (ref 0.1–1)
BSA FOR ECHO PROCEDURE: 1.52 M2
BUN SERPL-MCNC: 33 MG/DL (ref 8–23)
CALCIUM SERPL-MCNC: 9.6 MG/DL (ref 8.7–10.5)
CHLORIDE SERPL-SCNC: 108 MMOL/L (ref 95–110)
CO2 SERPL-SCNC: 24 MMOL/L (ref 23–29)
CREAT SERPL-MCNC: 1.5 MG/DL (ref 0.5–1.4)
CV ECHO LV RWT: 0.45 CM
DIFFERENTIAL METHOD BLD: ABNORMAL
DOP CALC AO PEAK VEL: 1.39 M/S
DOP CALC AO VTI: 34.3 CM
DOP CALC LVOT AREA: 3.5 CM2
DOP CALC LVOT DIAMETER: 2.11 CM
DOP CALC LVOT PEAK VEL: 1.57 M/S
DOP CALC LVOT STROKE VOLUME: 134.2 CM3
DOP CALC MV VTI: 19.8 CM
DOP CALCLVOT PEAK VEL VTI: 38.4 CM
E WAVE DECELERATION TIME: 205.92 MSEC
E/A RATIO: 0.76
E/E' RATIO: 11.67 M/S
ECHO LV POSTERIOR WALL: 0.96 CM (ref 0.6–1.1)
EOSINOPHIL # BLD AUTO: 0.3 K/UL (ref 0–0.5)
EOSINOPHIL NFR BLD: 3.9 % (ref 0–8)
ERYTHROCYTE [DISTWIDTH] IN BLOOD BY AUTOMATED COUNT: 13.2 % (ref 11.5–14.5)
EST. GFR  (NO RACE VARIABLE): 37 ML/MIN/1.73 M^2
FRACTIONAL SHORTENING: 28 % (ref 28–44)
GLUCOSE SERPL-MCNC: 99 MG/DL (ref 70–110)
HCT VFR BLD AUTO: 37.8 % (ref 37–48.5)
HGB BLD-MCNC: 12.2 G/DL (ref 12–16)
IMM GRANULOCYTES # BLD AUTO: 0 K/UL (ref 0–0.04)
IMM GRANULOCYTES NFR BLD AUTO: 0 % (ref 0–0.5)
INTERVENTRICULAR SEPTUM: 0.98 CM (ref 0.6–1.1)
IVC DIAMETER: 1.41 CM
LA MAJOR: 4.21 CM
LA MINOR: 4.46 CM
LA WIDTH: 3.4 CM
LEFT ATRIUM SIZE: 3.45 CM
LEFT ATRIUM VOLUME INDEX MOD: 22.2 ML/M2
LEFT ATRIUM VOLUME INDEX: 28.6 ML/M2
LEFT ATRIUM VOLUME MOD: 33.54 CM3
LEFT ATRIUM VOLUME: 43.19 CM3
LEFT INTERNAL DIMENSION IN SYSTOLE: 3.08 CM (ref 2.1–4)
LEFT VENTRICLE DIASTOLIC VOLUME INDEX: 53.46 ML/M2
LEFT VENTRICLE DIASTOLIC VOLUME: 80.73 ML
LEFT VENTRICLE MASS INDEX: 89 G/M2
LEFT VENTRICLE SYSTOLIC VOLUME INDEX: 24.7 ML/M2
LEFT VENTRICLE SYSTOLIC VOLUME: 37.3 ML
LEFT VENTRICULAR INTERNAL DIMENSION IN DIASTOLE: 4.25 CM (ref 3.5–6)
LEFT VENTRICULAR MASS: 134.06 G
LV LATERAL E/E' RATIO: 10 M/S
LV SEPTAL E/E' RATIO: 14 M/S
LVOT MG: 5.42 MMHG
LVOT MV: 1.08 CM/S
LYMPHOCYTES # BLD AUTO: 3.2 K/UL (ref 1–4.8)
LYMPHOCYTES NFR BLD: 49.3 % (ref 18–48)
MAGNESIUM SERPL-MCNC: 2 MG/DL (ref 1.6–2.6)
MCH RBC QN AUTO: 27.4 PG (ref 27–31)
MCHC RBC AUTO-ENTMCNC: 32.3 G/DL (ref 32–36)
MCV RBC AUTO: 85 FL (ref 82–98)
MONOCYTES # BLD AUTO: 0.5 K/UL (ref 0.3–1)
MONOCYTES NFR BLD: 8.2 % (ref 4–15)
MV A" WAVE DURATION": 117.03 MSEC
MV MEAN GRADIENT: 1 MMHG
MV PEAK A VEL: 0.92 M/S
MV PEAK E VEL: 0.7 M/S
MV PEAK GRADIENT: 3 MMHG
MV STENOSIS PRESSURE HALF TIME: 59.72 MS
MV VALVE AREA BY CONTINUITY EQUATION: 6.78 CM2
MV VALVE AREA P 1/2 METHOD: 3.68 CM2
NEUTROPHILS # BLD AUTO: 2.4 K/UL (ref 1.8–7.7)
NEUTROPHILS NFR BLD: 37.4 % (ref 38–73)
NRBC BLD-RTO: 0 /100 WBC
OHS LV EJECTION FRACTION SIMPSONS BIPLANE MOD: 58 %
OHS QRS DURATION: 78 MS
OHS QTC CALCULATION: 506 MS
PHOSPHATE SERPL-MCNC: 3.9 MG/DL (ref 2.7–4.5)
PISA AR MAX VEL: 3.44 M/S
PISA TR MAX VEL: 2.11 M/S
PLATELET # BLD AUTO: 342 K/UL (ref 150–450)
PMV BLD AUTO: 9.7 FL (ref 9.2–12.9)
POCT GLUCOSE: 143 MG/DL (ref 70–110)
POCT GLUCOSE: 87 MG/DL (ref 70–110)
POCT GLUCOSE: 95 MG/DL (ref 70–110)
POCT GLUCOSE: 96 MG/DL (ref 70–110)
POTASSIUM SERPL-SCNC: 4.1 MMOL/L (ref 3.5–5.1)
PROT SERPL-MCNC: 7 G/DL (ref 6–8.4)
PULM VEIN S/D RATIO: 1.96
PV MV: 0.63 M/S
PV PEAK D VEL: 0.25 M/S
PV PEAK GRADIENT: 3 MMHG
PV PEAK S VEL: 0.49 M/S
PV PEAK VELOCITY: 0.81 M/S
RA MAJOR: 4.05 CM
RA PRESSURE ESTIMATED: 3 MMHG
RA WIDTH: 2.14 CM
RBC # BLD AUTO: 4.45 M/UL (ref 4–5.4)
RV TB RVSP: 5 MMHG
RV TISSUE DOPPLER FREE WALL SYSTOLIC VELOCITY 1 (APICAL 4 CHAMBER VIEW): 9.73 CM/S
SINUS: 3.16 CM
SODIUM SERPL-SCNC: 143 MMOL/L (ref 136–145)
STJ: 3.01 CM
TDI LATERAL: 0.07 M/S
TDI SEPTAL: 0.05 M/S
TDI: 0.06 M/S
TR MAX PG: 18 MMHG
TRICUSPID ANNULAR PLANE SYSTOLIC EXCURSION: 1.65 CM
TSH SERPL DL<=0.005 MIU/L-ACNC: 0.73 UIU/ML (ref 0.4–4)
TV REST PULMONARY ARTERY PRESSURE: 21 MMHG
WBC # BLD AUTO: 6.49 K/UL (ref 3.9–12.7)
Z-SCORE OF LEFT VENTRICULAR DIMENSION IN END DIASTOLE: -0.44
Z-SCORE OF LEFT VENTRICULAR DIMENSION IN END SYSTOLE: 0.88

## 2024-03-26 PROCEDURE — 84100 ASSAY OF PHOSPHORUS: CPT

## 2024-03-26 PROCEDURE — 80053 COMPREHEN METABOLIC PANEL: CPT

## 2024-03-26 PROCEDURE — 63600175 PHARM REV CODE 636 W HCPCS: Performed by: NURSE ANESTHETIST, CERTIFIED REGISTERED

## 2024-03-26 PROCEDURE — 84443 ASSAY THYROID STIM HORMONE: CPT

## 2024-03-26 PROCEDURE — 93005 ELECTROCARDIOGRAM TRACING: CPT

## 2024-03-26 PROCEDURE — 27236 TREAT THIGH FRACTURE: CPT | Mod: LT,,, | Performed by: ORTHOPAEDIC SURGERY

## 2024-03-26 PROCEDURE — 36415 COLL VENOUS BLD VENIPUNCTURE: CPT

## 2024-03-26 PROCEDURE — 85025 COMPLETE CBC W/AUTO DIFF WBC: CPT

## 2024-03-26 PROCEDURE — 99223 1ST HOSP IP/OBS HIGH 75: CPT | Mod: 25,GC,, | Performed by: INTERNAL MEDICINE

## 2024-03-26 PROCEDURE — C1769 GUIDE WIRE: HCPCS | Performed by: ORTHOPAEDIC SURGERY

## 2024-03-26 PROCEDURE — 37000008 HC ANESTHESIA 1ST 15 MINUTES: Performed by: ORTHOPAEDIC SURGERY

## 2024-03-26 PROCEDURE — 93010 ELECTROCARDIOGRAM REPORT: CPT | Mod: ,,, | Performed by: INTERNAL MEDICINE

## 2024-03-26 PROCEDURE — 25000003 PHARM REV CODE 250: Performed by: NURSE ANESTHETIST, CERTIFIED REGISTERED

## 2024-03-26 PROCEDURE — D9220A PRA ANESTHESIA: Mod: CRNA,,, | Performed by: NURSE ANESTHETIST, CERTIFIED REGISTERED

## 2024-03-26 PROCEDURE — C1713 ANCHOR/SCREW BN/BN,TIS/BN: HCPCS | Performed by: ORTHOPAEDIC SURGERY

## 2024-03-26 PROCEDURE — 36000710: Performed by: ORTHOPAEDIC SURGERY

## 2024-03-26 PROCEDURE — 37000009 HC ANESTHESIA EA ADD 15 MINS: Performed by: ORTHOPAEDIC SURGERY

## 2024-03-26 PROCEDURE — 0QS734Z REPOSITION LEFT UPPER FEMUR WITH INTERNAL FIXATION DEVICE, PERCUTANEOUS APPROACH: ICD-10-PCS | Performed by: ORTHOPAEDIC SURGERY

## 2024-03-26 PROCEDURE — 83735 ASSAY OF MAGNESIUM: CPT

## 2024-03-26 PROCEDURE — 36000711: Performed by: ORTHOPAEDIC SURGERY

## 2024-03-26 PROCEDURE — 71000033 HC RECOVERY, INTIAL HOUR: Performed by: ORTHOPAEDIC SURGERY

## 2024-03-26 PROCEDURE — 11000001 HC ACUTE MED/SURG PRIVATE ROOM

## 2024-03-26 PROCEDURE — 27201423 OPTIME MED/SURG SUP & DEVICES STERILE SUPPLY: Performed by: ORTHOPAEDIC SURGERY

## 2024-03-26 PROCEDURE — 25000003 PHARM REV CODE 250

## 2024-03-26 PROCEDURE — D9220A PRA ANESTHESIA: Mod: ANES,,, | Performed by: STUDENT IN AN ORGANIZED HEALTH CARE EDUCATION/TRAINING PROGRAM

## 2024-03-26 DEVICE — SCREW BONE CANN 16MM 6.5X70MM: Type: IMPLANTABLE DEVICE | Site: HIP | Status: FUNCTIONAL

## 2024-03-26 DEVICE — SCREW CANN 16MM THRD 6.5X75 SS: Type: IMPLANTABLE DEVICE | Site: HIP | Status: FUNCTIONAL

## 2024-03-26 DEVICE — WIRE 2.8 X 300 MM THREADED: Type: IMPLANTABLE DEVICE | Site: HIP | Status: FUNCTIONAL

## 2024-03-26 RX ORDER — DEXAMETHASONE SODIUM PHOSPHATE 4 MG/ML
INJECTION, SOLUTION INTRA-ARTICULAR; INTRALESIONAL; INTRAMUSCULAR; INTRAVENOUS; SOFT TISSUE
Status: DISCONTINUED | OUTPATIENT
Start: 2024-03-26 | End: 2024-03-26

## 2024-03-26 RX ORDER — ROCURONIUM BROMIDE 10 MG/ML
INJECTION, SOLUTION INTRAVENOUS
Status: DISCONTINUED | OUTPATIENT
Start: 2024-03-26 | End: 2024-03-26

## 2024-03-26 RX ORDER — PHENYLEPHRINE HYDROCHLORIDE 10 MG/ML
INJECTION INTRAVENOUS
Status: DISCONTINUED | OUTPATIENT
Start: 2024-03-26 | End: 2024-03-26

## 2024-03-26 RX ORDER — ATORVASTATIN CALCIUM 20 MG/1
20 TABLET, FILM COATED ORAL DAILY
Status: DISCONTINUED | OUTPATIENT
Start: 2024-03-26 | End: 2024-03-28 | Stop reason: HOSPADM

## 2024-03-26 RX ORDER — LIDOCAINE HYDROCHLORIDE 20 MG/ML
INJECTION INTRAVENOUS
Status: DISCONTINUED | OUTPATIENT
Start: 2024-03-26 | End: 2024-03-26

## 2024-03-26 RX ORDER — ONDANSETRON HYDROCHLORIDE 2 MG/ML
INJECTION, SOLUTION INTRAVENOUS
Status: DISCONTINUED | OUTPATIENT
Start: 2024-03-26 | End: 2024-03-26

## 2024-03-26 RX ORDER — ACETAMINOPHEN 10 MG/ML
INJECTION, SOLUTION INTRAVENOUS
Status: DISCONTINUED | OUTPATIENT
Start: 2024-03-26 | End: 2024-03-26

## 2024-03-26 RX ORDER — MIDAZOLAM HYDROCHLORIDE 1 MG/ML
INJECTION, SOLUTION INTRAMUSCULAR; INTRAVENOUS
Status: DISCONTINUED | OUTPATIENT
Start: 2024-03-26 | End: 2024-03-26

## 2024-03-26 RX ORDER — CEFAZOLIN SODIUM 2 G/50ML
2 SOLUTION INTRAVENOUS
Status: DISCONTINUED | OUTPATIENT
Start: 2024-03-26 | End: 2024-03-26

## 2024-03-26 RX ORDER — CEFAZOLIN SODIUM 1 G/3ML
INJECTION, POWDER, FOR SOLUTION INTRAMUSCULAR; INTRAVENOUS
Status: DISCONTINUED | OUTPATIENT
Start: 2024-03-26 | End: 2024-03-26

## 2024-03-26 RX ORDER — CEFAZOLIN SODIUM 2 G/50ML
2 SOLUTION INTRAVENOUS
Status: DISCONTINUED | OUTPATIENT
Start: 2024-03-27 | End: 2024-03-27

## 2024-03-26 RX ORDER — OXYCODONE AND ACETAMINOPHEN 5; 325 MG/1; MG/1
1 TABLET ORAL EVERY 6 HOURS PRN
Status: DISCONTINUED | OUTPATIENT
Start: 2024-03-26 | End: 2024-03-28 | Stop reason: HOSPADM

## 2024-03-26 RX ORDER — FENTANYL CITRATE 50 UG/ML
INJECTION, SOLUTION INTRAMUSCULAR; INTRAVENOUS
Status: DISCONTINUED | OUTPATIENT
Start: 2024-03-26 | End: 2024-03-26

## 2024-03-26 RX ORDER — PROPOFOL 10 MG/ML
VIAL (ML) INTRAVENOUS
Status: DISCONTINUED | OUTPATIENT
Start: 2024-03-26 | End: 2024-03-26

## 2024-03-26 RX ADMIN — CEFAZOLIN 2 G: 330 INJECTION, POWDER, FOR SOLUTION INTRAMUSCULAR; INTRAVENOUS at 07:03

## 2024-03-26 RX ADMIN — SODIUM CHLORIDE, SODIUM LACTATE, POTASSIUM CHLORIDE, AND CALCIUM CHLORIDE: .6; .31; .03; .02 INJECTION, SOLUTION INTRAVENOUS at 06:03

## 2024-03-26 RX ADMIN — PHENYLEPHRINE HYDROCHLORIDE 300 MCG: 10 INJECTION INTRAVENOUS at 06:03

## 2024-03-26 RX ADMIN — ROCURONIUM BROMIDE 50 MG: 10 INJECTION, SOLUTION INTRAVENOUS at 06:03

## 2024-03-26 RX ADMIN — FENTANYL CITRATE 50 MCG: 50 INJECTION INTRAMUSCULAR; INTRAVENOUS at 07:03

## 2024-03-26 RX ADMIN — ATORVASTATIN CALCIUM 20 MG: 20 TABLET, FILM COATED ORAL at 09:03

## 2024-03-26 RX ADMIN — ONDANSETRON 4 MG: 2 INJECTION, SOLUTION INTRAMUSCULAR; INTRAVENOUS at 07:03

## 2024-03-26 RX ADMIN — PHENYLEPHRINE HYDROCHLORIDE 200 MCG: 10 INJECTION INTRAVENOUS at 06:03

## 2024-03-26 RX ADMIN — LIDOCAINE HYDROCHLORIDE 100 MG: 20 INJECTION, SOLUTION INTRAVENOUS at 06:03

## 2024-03-26 RX ADMIN — PHENYLEPHRINE HYDROCHLORIDE 200 MCG: 10 INJECTION INTRAVENOUS at 07:03

## 2024-03-26 RX ADMIN — FENTANYL CITRATE 50 MCG: 50 INJECTION INTRAMUSCULAR; INTRAVENOUS at 06:03

## 2024-03-26 RX ADMIN — OXYCODONE HYDROCHLORIDE AND ACETAMINOPHEN 1 TABLET: 5; 325 TABLET ORAL at 06:03

## 2024-03-26 RX ADMIN — AMLODIPINE BESYLATE 10 MG: 5 TABLET ORAL at 09:03

## 2024-03-26 RX ADMIN — DEXAMETHASONE SODIUM PHOSPHATE 4 MG: 4 INJECTION, SOLUTION INTRA-ARTICULAR; INTRALESIONAL; INTRAMUSCULAR; INTRAVENOUS; SOFT TISSUE at 07:03

## 2024-03-26 RX ADMIN — ACETAMINOPHEN 1000 MG: 10 INJECTION, SOLUTION INTRAVENOUS at 07:03

## 2024-03-26 RX ADMIN — OXYCODONE HYDROCHLORIDE AND ACETAMINOPHEN 1 TABLET: 5; 325 TABLET ORAL at 08:03

## 2024-03-26 RX ADMIN — PROPOFOL 100 MG: 10 INJECTION, EMULSION INTRAVENOUS at 06:03

## 2024-03-26 RX ADMIN — SUGAMMADEX 200 MG: 100 INJECTION, SOLUTION INTRAVENOUS at 07:03

## 2024-03-26 RX ADMIN — MIDAZOLAM 2 MG: 1 INJECTION INTRAMUSCULAR; INTRAVENOUS at 06:03

## 2024-03-26 RX ADMIN — ACETAMINOPHEN 650 MG: 325 TABLET ORAL at 05:03

## 2024-03-26 NOTE — ASSESSMENT & PLAN NOTE
ASCVD (Atherosclerotic Cardiovascular Disease) 2013 Risk Calculator from [employer]HA/ACC from [employer]  on 3/26/2024  ** All calculations should be rechecked by clinician prior to use **    RESULT SUMMARY:     Moderate- to high-intensity statin recommended because 10-year risk >7.5%    To view statin dosages by intensity, see Evidence section.    13.7%    Risk of cardiovascular event (coronary or stroke death or non-fatal MI or stroke) in next 10 years.    7.7%    10-year cardiovascular risk if risk factors were optimal.      INPUTS:  Age --> 73 years  Diabetes --> 0 = No  Sex --> 0 = Female  Smoker --> 1 = Yes  Total cholesterol --> 215 mg/dL  HDL cholesterol --> 51 mg/dL  Systolic blood pressure --> 114 mm Hg  Treatment for hypertension --> 0 = No  Race --> 1 = White    Plan:  atorvastatin 20

## 2024-03-26 NOTE — ASSESSMENT & PLAN NOTE
Patient with history of CVA in 2014, long history of vertigo after CVA, could be residual effect after CVA.     Plan:  Atorvastatin 20

## 2024-03-26 NOTE — SUBJECTIVE & OBJECTIVE
"Past Medical History:   Diagnosis Date    Anxiety     Bladder incontinence     Diabetes mellitus, type 2     History of CVA (cerebrovascular accident) 2015    Hypertension     Insomnia     Osteoarthritis        No past surgical history on file.    Review of patient's allergies indicates:  No Known Allergies    No current facility-administered medications on file prior to encounter.     Current Outpatient Medications on File Prior to Encounter   Medication Sig    ergocalciferol (ERGOCALCIFEROL) 50,000 unit Cap Take 50,000 Units by mouth every 7 days.    JANUVIA 100 mg Tab Take 100 mg by mouth once daily.    losartan-hydrochlorothiazide 50-12.5 mg (HYZAAR) 50-12.5 mg per tablet Take 1 tablet by mouth once daily.    meclizine (ANTIVERT) 25 mg tablet Take 1 tablet (25 mg total) by mouth every 6 (six) hours as needed for Dizziness. (Patient taking differently: Take 25 mg by mouth 3 (three) times daily as needed for Dizziness.)    solifenacin (VESICARE) 10 MG tablet Take 1 tablet (10 mg total) by mouth once daily.    amLODIPine (NORVASC) 10 MG tablet Take 1 tablet (10 mg total) by mouth once daily. (Patient not taking: Reported on 3/25/2024)    blood sugar diagnostic (BLOOD GLUCOSE TEST) Strp Use 1-2 x a day to check glucoses before meals.    blood-glucose meter kit Use as instructed    lancets Misc 1 Units by Misc.(Non-Drug; Combo Route) route daily as needed.    pen needle, diabetic 31 gauge x 1/4" Ndle Use to check insulin twice daily    [DISCONTINUED] amlodipine-atorvastatin (CADUET) 10-10 mg per tablet Take 1 tablet by mouth once daily.    [DISCONTINUED] aspirin (ECOTRIN) 81 MG EC tablet Take 81 mg by mouth once daily.    [DISCONTINUED] diazePAM (VALIUM) 5 MG tablet Take 0.5 tablets (2.5 mg total) by mouth every 8 (eight) hours as needed (Vertigo refractory to meclizine).    [DISCONTINUED] gabapentin (NEURONTIN) 300 MG capsule Take 1 capsule (300 mg total) by mouth 3 (three) times daily.    [DISCONTINUED] LANTUS " SOLOSTAR U-100 INSULIN glargine 100 units/mL (3mL) SubQ pen INJECT 10 UNITS INTO THE SKIN Q NIGHT    [DISCONTINUED] rosuvastatin (CRESTOR) 20 MG tablet Take 1 tablet (20 mg total) by mouth once daily.    [DISCONTINUED] traZODone (DESYREL) 50 MG tablet Take 1 tablet (50 mg total) by mouth every evening.    [DISCONTINUED] triamcinolone acetonide 0.1% (KENALOG) 0.1 % cream Apply topically 2 (two) times daily.    [DISCONTINUED] venlafaxine (EFFEXOR-XR) 75 MG 24 hr capsule Take 1 capsule (75 mg total) by mouth once daily.     Family History       Problem Relation (Age of Onset)    Cancer Paternal Uncle    Coronary artery disease Mother    Diabetes Mother, Father    Heart failure Daughter    Hypertension Mother          Tobacco Use    Smoking status: Heavy Smoker     Current packs/day: 1.00     Types: Cigarettes    Smokeless tobacco: Never   Substance and Sexual Activity    Alcohol use: Not Currently     Alcohol/week: 0.0 standard drinks of alcohol    Drug use: Not Currently    Sexual activity: Not on file     Review of Systems   Constitutional:  Negative for activity change, appetite change, chills, fatigue and fever.   Eyes:  Negative for visual disturbance.   Respiratory:  Negative for cough, chest tightness, shortness of breath, wheezing and stridor.    Cardiovascular:  Negative for chest pain, palpitations and leg swelling.   Gastrointestinal:  Negative for diarrhea, nausea and vomiting.   Genitourinary:  Negative for dysuria.   Skin:  Negative for color change, pallor and rash.   Neurological:  Positive for dizziness, syncope and light-headedness. Negative for seizures, facial asymmetry and headaches.   Psychiatric/Behavioral:  Negative for agitation and confusion.    All other systems reviewed and are negative.    Objective:     Vital Signs (Most Recent):  Temp: 98.2 °F (36.8 °C) (03/25/24 2317)  Pulse: 73 (03/25/24 2317)  Resp: 18 (03/25/24 2317)  BP: (!) 124/59 (03/25/24 2317)  SpO2: 98 % (03/25/24 2317) Vital  Signs (24h Range):  Temp:  [97.8 °F (36.6 °C)-98.5 °F (36.9 °C)] 98.2 °F (36.8 °C)  Pulse:  [73-91] 73  Resp:  [17-18] 18  SpO2:  [98 %-100 %] 98 %  BP: (114-156)/(59-84) 124/59     Weight: 55.3 kg (121 lb 14.6 oz)  Body mass index is 23.81 kg/m².     Physical Exam  Vitals and nursing note reviewed.   Constitutional:       General: She is not in acute distress.     Appearance: Normal appearance. She is normal weight. She is not ill-appearing, toxic-appearing or diaphoretic.   HENT:      Head: Normocephalic and atraumatic.      Comments: No contusions, lacerations, or bruises on head     Right Ear: External ear normal.      Left Ear: External ear normal.      Nose: Nose normal.      Mouth/Throat:      Mouth: Mucous membranes are moist.      Pharynx: Oropharynx is clear.   Eyes:      General: No scleral icterus.     Extraocular Movements: Extraocular movements intact.      Pupils: Pupils are equal, round, and reactive to light.   Cardiovascular:      Rate and Rhythm: Normal rate and regular rhythm.      Pulses: Normal pulses.      Heart sounds: Normal heart sounds.   Pulmonary:      Effort: Pulmonary effort is normal.      Breath sounds: Normal breath sounds.   Abdominal:      General: Abdomen is flat. There is no distension.      Tenderness: There is no abdominal tenderness.   Musculoskeletal:      Cervical back: Normal range of motion and neck supple.      Right lower leg: No edema.      Left lower leg: No edema.      Comments: Hips no tenderness to palpation bilaterally  Mild pain with flexion/extension of left hip   Skin:     General: Skin is warm and dry.   Neurological:      General: No focal deficit present.      Mental Status: She is alert and oriented to person, place, and time.              CRANIAL NERVES     CN III, IV, VI   Pupils are equal, round, and reactive to light.       Significant Labs: All pertinent labs within the past 24 hours have been reviewed.  CBC:   Recent Labs   Lab 03/25/24  1831   WBC  7.75   HGB 13.0   HCT 40.7        CMP:   Recent Labs   Lab 03/25/24  1831      K 3.7      CO2 27      BUN 32*   CREATININE 1.36   CALCIUM 9.9   PROT 8.5*   ALBUMIN 4.5   BILITOT 0.4   ALKPHOS 70   AST 17   ALT 9*   ANIONGAP 11       Significant Imaging: I have reviewed all pertinent imaging results/findings within the past 24 hours.  CXR: I have reviewed all pertinent results/findings within the past 24 hours and my personal findings are:  loop recorder visualized, no cardiomegaly and no pulmonary findings  EKG: I have reviewed all pertinent results/findings within the past 24 hours and my personal findings are: normal sinus rhythm, prolonged QTc of  506

## 2024-03-26 NOTE — CONSULTS
"[facility] Consult Note     Chief Complaint:  L nondisplaced hip fracture      HPI:  Nik 73F who sustained a GLF 5 days prior. Medical hx significant for DM (last A1c in system 6.7), HTN. CVA (no motor deficits-vertigo). She had a mechanical slip and fall in her kitchen, was able to ambulate afterwards. Presented today to outside ED for increased pain. CT w/ L subcapital femoral neck fx.         PMH:    Past Medical History:   Diagnosis Date    Anxiety     Bladder incontinence     Diabetes mellitus, type 2     History of CVA (cerebrovascular accident) 2015    Hypertension     Insomnia     Osteoarthritis      PSH:  No past surgical history on file.  FH:  Noncontributory  SH: current smoker     Meds:    No current facility-administered medications on file prior to encounter.     Current Outpatient Medications on File Prior to Encounter   Medication Sig Dispense Refill    ergocalciferol (ERGOCALCIFEROL) 50,000 unit Cap Take 50,000 Units by mouth every 7 days.      JANUVIA 100 mg Tab Take 100 mg by mouth once daily.      losartan-hydrochlorothiazide 50-12.5 mg (HYZAAR) 50-12.5 mg per tablet Take 1 tablet by mouth once daily.      meclizine (ANTIVERT) 25 mg tablet Take 1 tablet (25 mg total) by mouth every 6 (six) hours as needed for Dizziness. (Patient taking differently: Take 25 mg by mouth 3 (three) times daily as needed for Dizziness.) 120 tablet 0    solifenacin (VESICARE) 10 MG tablet Take 1 tablet (10 mg total) by mouth once daily. 90 tablet 1    amLODIPine (NORVASC) 10 MG tablet Take 1 tablet (10 mg total) by mouth once daily. (Patient not taking: Reported on 3/25/2024) 90 tablet 3    blood sugar diagnostic (BLOOD GLUCOSE TEST) Strp Use 1-2 x a day to check glucoses before meals. 100 strip 11    blood-glucose meter kit Use as instructed 1 each 0    lancets Misc 1 Units by Misc.(Non-Drug; Combo Route) route daily as needed. 100 each 11    pen needle, diabetic 31 gauge x 1/4" Ndle Use to check insulin twice " daily 100 each 11    [DISCONTINUED] amlodipine-atorvastatin (CADUET) 10-10 mg per tablet Take 1 tablet by mouth once daily.      [DISCONTINUED] aspirin (ECOTRIN) 81 MG EC tablet Take 81 mg by mouth once daily.      [DISCONTINUED] diazePAM (VALIUM) 5 MG tablet Take 0.5 tablets (2.5 mg total) by mouth every 8 (eight) hours as needed (Vertigo refractory to meclizine). 10 tablet 0    [DISCONTINUED] gabapentin (NEURONTIN) 300 MG capsule Take 1 capsule (300 mg total) by mouth 3 (three) times daily. 90 capsule 5    [DISCONTINUED] LANTUS SOLOSTAR U-100 INSULIN glargine 100 units/mL (3mL) SubQ pen INJECT 10 UNITS INTO THE SKIN Q NIGHT      [DISCONTINUED] rosuvastatin (CRESTOR) 20 MG tablet Take 1 tablet (20 mg total) by mouth once daily. 90 tablet 3    [DISCONTINUED] traZODone (DESYREL) 50 MG tablet Take 1 tablet (50 mg total) by mouth every evening. 90 tablet 1    [DISCONTINUED] triamcinolone acetonide 0.1% (KENALOG) 0.1 % cream Apply topically 2 (two) times daily. 30 g 0    [DISCONTINUED] venlafaxine (EFFEXOR-XR) 75 MG 24 hr capsule Take 1 capsule (75 mg total) by mouth once daily. 30 capsule 11       Allergies:  Review of patient's allergies indicates:  No Known Allergies     ROS:  otherwise negative except indicated in HPI      Exam:  Vitals:  /70 (BP Location: Right arm, Patient Position: Lying)   Pulse 80   Temp 97.8 °F (36.6 °C) (Oral)   Resp 18   Wt 55.3 kg (121 lb 14.6 oz)   SpO2 100%   BMI 23.81 kg/m²   Gen:  Awake and alert, NAD  Resp: No increased WOB  Cards: RRR by PP  Abd:  Non-distended, benign    LLE:  No gross deformity   No open wounds or abrasions  No significant swelling or bruising  TTP about hip  NonTTP about thigh, knee, leg, ankle, foot  ROM hip deferred   ROM normal hip, knee, ankle  TA/gastroc/EHL/FHL intact with 5/5 strength  SILT grossly  2+ DP pulse     Labs:  None pertinent      Imaging:  XR/CT reviewed - minimally impacted subcapital L hip fx.      Assessment/Plan:  - Admit to  family medicine team, [facility] medicine care and surgical risk stratification.  - Had a long discussion with patient about recommendation for operative fixation of hip fracture.  Risks including bleeding, infection, damage to surrounding structures, intra-op fracture, etc reviewed.  Benefits including early mobilization reviewed.  Expectation of going down one level of functionality post-op discussed.  All questions answered.  Patient would like to proceed with surgery.  - Written informed consent obtained for surgery.  - Case request placed for [facility] L hip with Dr. Bloom. OR  informed   - Discussed plan with admitting team  - NPO midnight  - Ana for prolonged immobilization  - PT/OT for mobilization post-op  - NWB LLE  - Ok to start DVT ppx POD1     Josselyn Lawrence MD  [facility]

## 2024-03-26 NOTE — CONSULTS
" [facility] Consult Note     Cardiology Attending: Dr. Liu  Cardiology Fellow: Albin Mautte DO     Date of Admit: 3/25/2024  Date of Consult: 3/26/2024    Reason for Consultation:     "Syncope"    History of Present Illness:      Elda Yoder is a 73 y.o. 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 (1/2023), chronic tobacco use, DM (5.8 A1c 3/25/24), HTN, osteoarthritis who presented to [facility] on 3/25 with complaints of fall about 1 week ago with subsequent L hip pain.     Patient was in her usual state of health including independent of ADLs until about 1 week ago when she reports experiencing a fall from standing height to floor with impact to her head and L hip. Patient states she was in the kitchen baking a cake on her feet when event occurred. She reports attempting to ambulate out of the kitchen when she fell to her left side. She reports experiencing dizziness prior to fall but states this was no worse then her baseline vertigo. She reports mild trauma to her head without loss of consciousness. She states she lives with her daughter and 2 friends who were able to check on her shortly after the fall. With their assistance she was able to ambulate to a chair. Since that time she has been able to bear weight but she has been limited by pain to her L hip prompting presentation to [facility] ED. Patient denies any additional symptoms prior to or after her fall including chest pain, palpitations, shortness of breath, vision changes, loss of bowel/bladder control, numbness, weakness, nausea, vomiting, dysuria.    Work up in the Ed was significant for non displaced subcapital L femoral neck fracture. [facility] with consult to [facility]. [facility] was consulted for syncope with request for ortho to evaluate for pre-operative risk " stratification.    Of note, patient denies personal history of MI, arrhythmia or HF. Loop recorder placed in 2014 without evidence of arrhythmia on follow up. Has stent in L iliac per chart review placed 1/2023 and history of CVA in 2014. Was on asa previously but states was not refilled so she stopped taking it. She also denies family history of arrhythmia or sudden cardiac death. She takes medications for hypertension including amlodipine 10mg daily and recently started Hyzaar 50-12.5mg daily about two weeks ago. She has a personal history of diabetes but is not taking insulin currently. She is able to perform mild to moderate activity without issue including sweeping, cooking, and walking up stairs.     Past Medical History:     Past Medical History:   Diagnosis Date    Anxiety     Bladder incontinence     Diabetes mellitus, type 2     History of CVA (cerebrovascular accident) 2015    Hypertension     Insomnia     Osteoarthritis      Surgical History:   No past surgical history on file.  L great toe amputation; L iliac a. Angiogram w stent placement (1/2023)    Allergies:   Review of patient's allergies indicates:  No Known Allergies  Family History:     Family History   Problem Relation Age of Onset    Diabetes Mother     Hypertension Mother     Coronary artery disease Mother         hx of cabg    Diabetes Father     Cancer Paternal Uncle         unknown    Heart failure Daughter      Social History:     Social History     Tobacco Use    Smoking status: Heavy Smoker     Current packs/day: 1.00     Types: Cigarettes    Smokeless tobacco: Never   Substance Use Topics    Alcohol use: Not Currently     Alcohol/week: 0.0 standard drinks of alcohol    Drug use: Not Currently     Review of Systems:     Review of Systems   All other systems reviewed and are negative.    Medications:     Home Medications:  Prior to Admission medications    Medication Sig Start Date End Date Taking? Authorizing Provider   ergocalciferol  "(ERGOCALCIFEROL) 50,000 unit Cap Take 50,000 Units by mouth every 7 days. 2/21/24  Yes Provider, Historical   JANUVIA 100 mg Tab Take 100 mg by mouth once daily.   Yes Provider, Historical   losartan-hydrochlorothiazide 50-12.5 mg (HYZAAR) 50-12.5 mg per tablet Take 1 tablet by mouth once daily. 2/16/24  Yes Provider, Historical   meclizine (ANTIVERT) 25 mg tablet Take 1 tablet (25 mg total) by mouth every 6 (six) hours as needed for Dizziness.  Patient taking differently: Take 25 mg by mouth 3 (three) times daily as needed for Dizziness. 6/28/22  Yes Moo Snyder MD   solifenacin (VESICARE) 10 MG tablet Take 1 tablet (10 mg total) by mouth once daily. 9/20/21  Yes Remedios Moseley MD   amLODIPine (NORVASC) 10 MG tablet Take 1 tablet (10 mg total) by mouth once daily.  Patient not taking: Reported on 3/25/2024 12/15/21   Medina Leung MD   blood sugar diagnostic (BLOOD GLUCOSE TEST) Strp Use 1-2 x a day to check glucoses before meals. 9/28/20   Remedios Moseley MD   blood-glucose meter kit Use as instructed 9/28/20   Remedios Moseley MD   lancets Misc 1 Units by Misc.(Non-Drug; Combo Route) route daily as needed. 9/28/20   Remedios Moseley MD   pen needle, diabetic 31 gauge x 1/4" Ndle Use to check insulin twice daily 6/10/20   Remedios Moseley MD       Current/Inpatient Medications:  Infusions:    Scheduled:   amLODIPine  10 mg Oral Daily    atorvastatin  20 mg Oral Daily     PRN:  acetaminophen, dextrose 10%, dextrose 10%, glucagon (human recombinant), glucose, glucose, influenza 65up-adj, insulin aspart U-100, melatonin, naloxone, ondansetron, oxyCODONE-acetaminophen, senna-docusate 8.6-50 mg, sodium chloride 0.9%    Objective:     24-hour Vitals:   Temp:  [97.4 °F (36.3 °C)-98.5 °F (36.9 °C)] 98.1 °F (36.7 °C)  Pulse:  [71-91] 71  Resp:  [17-18] 18  SpO2:  [96 %-100 %] 96 %  BP: (114-160)/(59-84) 142/67    Vitals: BP (!) 142/67   Pulse 71   Temp 98.1 °F (36.7 °C)   Resp 18   Ht 5' (1.524 m)   Wt " 55.3 kg (121 lb 14.6 oz)   SpO2 96%   BMI 23.81 kg/m²     Intake/Output Summary (Last 24 hours) at 3/26/2024 0849  Last data filed at 3/26/2024 0651  Gross per 24 hour   Intake 290 ml   Output 850 ml   Net -560 ml       Physical Exam  Vitals and nursing note reviewed. Exam conducted with a chaperone present.   Constitutional:       General: She is not in acute distress.     Appearance: She is not ill-appearing.   HENT:      Head: Normocephalic and atraumatic.      Right Ear: External ear normal.      Left Ear: External ear normal.      Nose: Nose normal. No congestion or rhinorrhea.      Mouth/Throat:      Mouth: Mucous membranes are moist.      Pharynx: Oropharynx is clear.   Eyes:      General:         Right eye: No discharge.         Left eye: No discharge.      Extraocular Movements: Extraocular movements intact.      Conjunctiva/sclera: Conjunctivae normal.      Pupils: Pupils are equal, round, and reactive to light.      Comments: No nystagmus on EOM   Cardiovascular:      Rate and Rhythm: Normal rate and regular rhythm.      Heart sounds: Normal heart sounds. No murmur heard.  Pulmonary:      Effort: Pulmonary effort is normal.      Breath sounds: Normal breath sounds. No wheezing.   Abdominal:      General: Abdomen is flat. Bowel sounds are normal. There is no distension.      Palpations: Abdomen is soft.      Tenderness: There is no abdominal tenderness. There is no guarding.   Musculoskeletal:      Cervical back: Normal range of motion and neck supple.      Right lower leg: No edema.      Left lower leg: No edema.      Comments: 5/5 strength to b/l upper extremities;   5/5 strength to RLE    No significant edema or erythema to L hip. +TTP anteriorly along femoral head. 4/5 strength to LLE at hip due to pain;     1+ b/l PT and DP pulses  2+ b/l radial pulses    Sensation to light touch in tact in all extremities     Neurological:      Mental Status: She is alert and oriented to person, place, and time.         Labs:   I have reviewed the following labs below:    Recent Labs   Lab 03/25/24  1831 03/25/24  2206 03/26/24  0540   WBC 7.75  --  6.49   HGB 13.0  --  12.2   HCT 40.7  --  37.8     --  342     --  143   K 3.7  --  4.1     --  108   CO2 27  --  24   BUN 32*  --  33*   CREATININE 1.36  --  1.5*     --  99   CALCIUM 9.9  --  9.6   MG  --   --  2.0   PHOS  --   --  3.9   PROT 8.5*  --  7.0   ALBUMIN 4.5  --  3.7   BILITOT 0.4  --  0.3   AST 17  --  10   ALT 9*  --  7*   ALKPHOS 70  --  60   TROPONINI  --  <0.006  --      TSH wnl  Mg 2.0  A1c 5.8%    Cardiovascular Studies/Imaging:   I have reviewed the following studies/reports below:    ECG (3/25/2024):  Baseline artifact; NSR, Prolonged Qtc 506, Non specific T wave changes; No previous ECGs availabe    TTE (2/1/2020):   LVEF 65-75%; Normal RV size and function; trace MR, Mild AR MAX PG 8.4 mmHg     LHC/Cors: None per chart    Nuclear Stress Test (9/05/13):   Normal or negative scan. No evidence of reperfusing ischemia.      Loop recorder (8/3/15):  No evidence of abnormal rhythm or rate.    Imaging:   X-Ray Chest AP Portable  Result Date: 3/25/2024  EXAMINATION: XR CHEST AP PORTABLE CLINICAL HISTORY: Encounter for other preprocedural examination TECHNIQUE: Single frontal view of the chest was performed. COMPARISON: None FINDINGS: Suspected cardiac monitor device.The lungs are clear, with normal appearance of pulmonary vasculature and no pleural effusion or pneumothorax. The cardiac silhouette is normal in size. The hilar and mediastinal contours are unremarkable. Bones are intact.     No acute abnormality. Electronically signed by: Clint Randle Date:    03/25/2024 Time:    19:00    CT Hip Without Contrast Left  Result Date: 3/25/2024  EXAMINATION: CT HIP WITHOUT CONTRAST LEFT CLINICAL HISTORY: Fracture, hip; TECHNIQUE: Axial CT through the pelvis and left hip with multiplanar reformations. COMPARISON: Earlier radiographs FINDINGS:  Fracture through the subcapital left femoral neck.  Mild degree of impaction.  No significant offset. No additional fractures. No advanced arthritic change of the hips.  Osteopenia.  Advanced arterial vascular calcifications.     Nondisplaced subcapital left femoral neck fracture. All CT scans at this facility are performed  using dose modulation techniques as appropriate to performed exam including the following:  automated exposure control; adjustment of mA and/or kV according to the patients size (this includes techniques or standardized protocols for targeted exams where dose is matched to indication/reason for exam: i.e. extremities or head);  iterative reconstruction technique. Electronically signed by: Frank Duran MD Date:    03/25/2024 Time:    15:19    X-Ray Hip 2 or 3 views Left (with Pelvis when performed)  Result Date: 3/25/2024  EXAMINATION: XR HIP WITH PELVIS WHEN PERFORMED, 2 OR 3 VIEWS LEFT CLINICAL HISTORY: Unspecified fall, initial encounter TECHNIQUE: AP pelvis plus two views left hip. COMPARISON: None FINDINGS: Deformity and lucency left subcapital femoral neck. No significant arthritic changes. Osteopenia. Advanced arterial vascular calcifications.     Suspected subcapital left femoral neck fracture.  Correlation with CT recommended. Electronically signed by: Frank Duran MD Date:    03/25/2024 Time:    14:11    Assessment:     Elda Yoder is a 73 y.o. 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 (1/2023), chronic tobacco use, DM (5.8 A1c 3/25/24), HTN, osteoarthritis who presented to [facility] on 3/25 after a fall 1 week prior that was complicated by non displaced L subcapital femoral neck fracture. [facility] consulted for syncope with request from Ortho for pre-op risk stratification.     Plan/Recommendations:     #Pre-syncope  #Non displaced subcapital  femoral neck fracture - pre operative risk stratification  History of frequent falls; loop recorder placed 2014 without evidence of arrhythmia on follow up in 2015; Has complaints of prodromal dizziness but this appears unchanged from baseline since CVA in 2014 per patient; denied additional prodromal symptoms  Echo 2020 with normal EF; Stress test 2013 without evidence of ischemia  EKG here NSR, trop negative; CXR without acute cardiopulmonary process  -TTE ordered by primary team  -Pre-syncope and fall unlikely to be secondary to cardiac cause by history and workup; consider additional neurologic workup of dizziness  -DASI score 26.95, Mets 6.05; RCRI score 1, Class II risk with 6% 30 day risk of MACE; Can consider preoperative BNP with post operative monitoring of EKG and troponin if BNP>92.  -No additional cardiac workup or intervention recommended prior to elective surgery. Risk discussed with patient and questions answered     #History of CVA  #History of PAD with L iliac stent  #History of carotid artery stenosis  #Current every day smoker  -Agree with restarting statin; consider increasing to high intensity dosing for greater LDL control    Remainder of care per primary team. Please reach out directly for additional concerns or questions.    Patient discussed with attending physician, Dr. Liu.     Albin Matute,   [facility] Internal Medicine-Pediatrics, PGY-3  [facility] Cardiology Service  03/26/2024 8:49 AM  [facility]

## 2024-03-26 NOTE — H&P
[facility] Interval H&P  The patient has been personally evaluated by me. They verbally confirmed they will undergo left hip CRPP with Dr. Bloom. There have been no changes in patient's health since the last time they were seen.    -cardiology has seen the patient and recommended proceeding with surgery without any additional workup  -TSH normal     See original consult note for additional details.    Josselyn Lawrence MD  [facility]

## 2024-03-26 NOTE — PROGRESS NOTES
Family Medicine  Progress Note    Patient Name: Elda Yoder  MRN: [id number]  Patient Class: IP- Inpatient   Admission Date: 3/25/2024  Length of Stay: 1 days  Attending Physician: Dr. Sharif  Primary Care Provider: Good, Provider        Subjective:     Principal Problem:Syncope and collapse    HPI:  Patient is a 73 y.o.-year-old female w/ PMHx CVA in 2014, loop recorder since 2014 for syncope since 2014, DM (5.8 A1c 3/25/24), HTN, osteoarthritis who presents to the ED with reports of a fall five days ago. She states five days prior to admission she felt dizzy in her kitchen when she fell and hit her head and her left hip. She denies loss of consciousness, she states that it was a light bump which left no lacerations or contusions, denies vision changes, nausea, vomiting, chest pain or palpitations, or tongue biting when she fell. She has not been having nausea, vomiting, or vision changes for the past five days since fall. She endorses falling on her left hip as well which was initially painful but she has been able to ambulate short distances and is not tender to palpation. She denies any fevers, chills, shortness of breath since the fall. Of note, patient states that she was originally on amlodipine 10 for years and was recently started on hyzaar approximately 2-3 weeks ago and she has been compliant with all her medications. She does not recall what her blood pressure is on her home medications, but she states it is in the 170s when she is off of them.     In the ED, initial vitals Temp 98.5F, /84, HR 87, RR 17, SpO2 100% on room air. CBC was WNL, CMP was WNL. And Estimated Creatinine Clearance: 29.6 mL/min (based on SCr of 1.36 mg/dL).  CXR showed cardiac monitoring device, no cardiomegaly and no effusions, hip x-ray showed left subcapital femoral neck fracture, CT of hip confirmednondisplaced subcapital left femoral neck fracture, EKG showed normal sinus rhythm with prolonged QTc of 506.   [facility] admitted patient for evaluation for syncope and medical optimization for procedure for left femoral neck fracture.       Interval History: no acute events overnight, denies fevers, chills, shortness of breath, chest pain. She endorses moderate left hip pain this morning.     Review of Systems   Constitutional:  Negative for activity change, appetite change, chills, fatigue and fever.   Eyes:  Negative for visual disturbance.   Respiratory:  Negative for cough, chest tightness, shortness of breath, wheezing and stridor.    Cardiovascular:  Negative for chest pain, palpitations and leg swelling.   Gastrointestinal:  Negative for diarrhea, nausea and vomiting.   Genitourinary:  Negative for dysuria.   Musculoskeletal:         Left hip pain   Skin:  Negative for color change, pallor and rash.   Neurological:  Negative for dizziness, seizures, syncope, facial asymmetry, light-headedness and headaches.   Psychiatric/Behavioral:  Negative for agitation and confusion.    All other systems reviewed and are negative.    Objective:     Vital Signs (Most Recent):  Temp: 97.4 °F (36.3 °C) (03/26/24 0416)  Pulse: 73 (03/26/24 0416)  Resp: 18 (03/26/24 0416)  BP: (!) 160/78 (03/26/24 0416)  SpO2: 96 % (03/26/24 0416) Vital Signs (24h Range):  Temp:  [97.4 °F (36.3 °C)-98.5 °F (36.9 °C)] 97.4 °F (36.3 °C)  Pulse:  [73-91] 73  Resp:  [17-18] 18  SpO2:  [96 %-100 %] 96 %  BP: (114-160)/(59-84) 160/78     Weight: 55.3 kg (121 lb 14.6 oz)  Body mass index is 23.81 kg/m².    Intake/Output Summary (Last 24 hours) at 3/26/2024 0631  Last data filed at 3/26/2024 0552  Gross per 24 hour   Intake 290 ml   Output 550 ml   Net -260 ml         Physical Exam  Vitals and nursing note reviewed.   Constitutional:       General: She is not in acute distress.     Appearance: Normal appearance. She is normal weight. She is not ill-appearing, toxic-appearing or diaphoretic.   HENT:      Head: Normocephalic and atraumatic.      Comments:  No contusions, lacerations, or bruises on head     Right Ear: External ear normal.      Left Ear: External ear normal.      Nose: Nose normal.      Mouth/Throat:      Mouth: Mucous membranes are moist.      Pharynx: Oropharynx is clear.   Eyes:      General: No scleral icterus.     Extraocular Movements: Extraocular movements intact.      Pupils: Pupils are equal, round, and reactive to light.   Cardiovascular:      Rate and Rhythm: Normal rate and regular rhythm.      Pulses: Normal pulses.      Heart sounds: Normal heart sounds.   Pulmonary:      Effort: Pulmonary effort is normal.      Breath sounds: Normal breath sounds.   Abdominal:      General: Abdomen is flat. There is no distension.      Tenderness: There is no abdominal tenderness.   Musculoskeletal:      Cervical back: Normal range of motion and neck supple.      Right lower leg: No edema.      Left lower leg: No edema.      Comments: Hips no tenderness to palpation bilaterally  Mild pain with flexion/extension of left hip   Skin:     General: Skin is warm and dry.   Neurological:      General: No focal deficit present.      Mental Status: She is alert and oriented to person, place, and time.             Significant Labs: All pertinent labs within the past 24 hours have been reviewed.  CBC:   Recent Labs   Lab 03/25/24  1831 03/26/24  0540   WBC 7.75 6.49   HGB 13.0 12.2   HCT 40.7 37.8    342     CMP:   Recent Labs   Lab 03/25/24  1831      K 3.7      CO2 27      BUN 32*   CREATININE 1.36   CALCIUM 9.9   PROT 8.5*   ALBUMIN 4.5   BILITOT 0.4   ALKPHOS 70   AST 17   ALT 9*   ANIONGAP 11       Significant Imaging: I have reviewed all pertinent imaging results/findings within the past 24 hours.    Assessment/Plan:      * Syncope and collapse  Patient with history of episodes of syncope since 2014, patient has a loop recorder visualized on CXR and confirmed on chart review. She had an episode of dizziness followed by fall with no  LOC or seizures five days prior to admission. She denied any nausea/vomiting, chest pain, SOB associated with her dizziness.   Troponin negative, lactic acid negative, EKG on admit showed normal sinus rhythm with prolonged QTc of 506. On day of admit she denied any headache, dizziness, vision changes.     Plan:  Consult cardiology, appreciate recs  F/u echo        Fracture of femoral neck, left  Fall 5 days prior to admission, patient able to ambulate but shorter distances than before fall. 5 day history of left hip pain to movement, no acute contusions or lacerations visualized. Hip xray showed left subcapital femoral neck fracture confirmed on CT which showed nondisplaced left femoral neck fracture as well.    Plan:  Pre-operative evaluation with risk assessment              -           Scheduled for left hip pinning on 3/26 by Dr. Bloom  -           DASI score: 30.2 w/ Functional Capacity in METS: 6.45 (>4) with a revised cardiac index   risk of 10.1%.    - Not on antiplatelets/anticoagulation.   - no h/o blood dyscrasias or previous reaction to anesthesia   - is a smoker. 1 ppd.  - has prior h/o HLD/CAD w/ CVA. ASCVD: 13.7%. Continue medical management w/ atorvastatin 20  - has prior h/o DM. Last HgbA1C:~5.8  - has no prior h/o thyroid disease. TSH: pending  - has prior h/o HTN. BP: 124/59.   - has no prior h/o COPD/Asthma/GRETCHEN/OHS, suspected COPD on prior notes from previous providors. Does not use CPAP.   - has no prior h/o HF. Echo: pending  - BMI: Body mass index is 23.81 kg/m².              -           The patient is not medically optimized to proceed with (per ACC/AHA reshma-operative guidelines) a moderate risk surgery.    -consulted cardiology - recommend cardiology evaluation  -recommend ECHO  -recommend TSH  -can restart DVT PPX on POD1 per ortho      Hyperlipemia  ASCVD (Atherosclerotic Cardiovascular Disease) 2013 Risk Calculator from AHA/ACC from [employer]  on 3/26/2024  ** All calculations should be  rechecked by clinician prior to use **    RESULT SUMMARY:     Moderate- to high-intensity statin recommended because 10-year risk >7.5%    To view statin dosages by intensity, see Evidence section.    13.7%    Risk of cardiovascular event (coronary or stroke death or non-fatal MI or stroke) in next 10 years.    7.7%    10-year cardiovascular risk if risk factors were optimal.      INPUTS:  Age --> 73 years  Diabetes --> 0 = No  Sex --> 0 = Female  Smoker --> 1 = Yes  Total cholesterol --> 215 mg/dL  HDL cholesterol --> 51 mg/dL  Systolic blood pressure --> 114 mm Hg  Treatment for hypertension --> 0 = No  Race --> 1 = White    Plan:  atorvastatin 20    Osteoarthritis        History of CVA (cerebrovascular accident)  Patient with history of CVA in 2014, long history of vertigo after CVA, could be residual effect after CVA.     Plan:  Atorvastatin 20    Essential hypertension  Chronic, controlled. Latest blood pressure and vitals reviewed-     Temp:  [97.4 °F (36.3 °C)-98.5 °F (36.9 °C)]   Pulse:  [73-91]   Resp:  [17-18]   BP: (114-160)/(59-84)   SpO2:  [96 %-100 %] .   Home meds for hypertension were reviewed and noted below.   Hypertension Medications               amLODIPine (NORVASC) 10 MG tablet Take 1 tablet (10 mg total) by mouth once daily.    losartan-hydrochlorothiazide 50-12.5 mg (HYZAAR) 50-12.5 mg per tablet Take 1 tablet by mouth once daily.          Will utilize p.r.n. blood pressure medication only if patient's blood pressure greater than 180/110 and she develops symptoms such as worsening chest pain or shortness of breath.    Plan:  Continue home amlodipine as it is a historic medication  Hold hyzaar as it is a more recent medication, consider adding if morning BP is elevated      VTE Risk Mitigation (From admission, onward)           Ordered     Reason for No Pharmacological VTE Prophylaxis  Once        Question:  Reasons:  Answer:  Physician Provided (leave comment)  Comment:  possible surgery     03/25/24 2129     IP VTE HIGH RISK PATIENT  Once         03/25/24 2129     Place sequential compression device  Until discontinued         03/25/24 2129                    Discharge Planning   CESILIA:      Code Status: Full Code   Is the patient medically ready for discharge?:     Reason for patient still in hospital (select all that apply): Patient trending condition         Too Hsu MD  [facility], PGY-1  03/26/2024

## 2024-03-26 NOTE — PLAN OF CARE
Problem: Adult Inpatient Plan of Care  Goal: Plan of Care Review  Outcome: Ongoing, Progressing  Patient AAox4. Blood sugar monitored overnight. Heart monitor placed as ordered. Skin intact . Pure wick in place. Last bm on 3/25. ortho MD consented patient for surgery. NPO since midnight.  Scds to BLE. Safety ensured. Call light within reach. CHG bath complete and pre op check list done

## 2024-03-26 NOTE — SUBJECTIVE & OBJECTIVE
Interval History: no acute events overnight, denies fevers, chills, shortness of breath, chest pain. She endorses moderate left hip pain this morning.     Review of Systems   Constitutional:  Negative for activity change, appetite change, chills, fatigue and fever.   Eyes:  Negative for visual disturbance.   Respiratory:  Negative for cough, chest tightness, shortness of breath, wheezing and stridor.    Cardiovascular:  Negative for chest pain, palpitations and leg swelling.   Gastrointestinal:  Negative for diarrhea, nausea and vomiting.   Genitourinary:  Negative for dysuria.   Musculoskeletal:         Left hip pain   Skin:  Negative for color change, pallor and rash.   Neurological:  Negative for dizziness, seizures, syncope, facial asymmetry, light-headedness and headaches.   Psychiatric/Behavioral:  Negative for agitation and confusion.    All other systems reviewed and are negative.    Objective:     Vital Signs (Most Recent):  Temp: 97.4 °F (36.3 °C) (03/26/24 0416)  Pulse: 73 (03/26/24 0416)  Resp: 18 (03/26/24 0416)  BP: (!) 160/78 (03/26/24 0416)  SpO2: 96 % (03/26/24 0416) Vital Signs (24h Range):  Temp:  [97.4 °F (36.3 °C)-98.5 °F (36.9 °C)] 97.4 °F (36.3 °C)  Pulse:  [73-91] 73  Resp:  [17-18] 18  SpO2:  [96 %-100 %] 96 %  BP: (114-160)/(59-84) 160/78     Weight: 55.3 kg (121 lb 14.6 oz)  Body mass index is 23.81 kg/m².    Intake/Output Summary (Last 24 hours) at 3/26/2024 0631  Last data filed at 3/26/2024 0552  Gross per 24 hour   Intake 290 ml   Output 550 ml   Net -260 ml         Physical Exam  Vitals and nursing note reviewed.   Constitutional:       General: She is not in acute distress.     Appearance: Normal appearance. She is normal weight. She is not ill-appearing, toxic-appearing or diaphoretic.   HENT:      Head: Normocephalic and atraumatic.      Comments: No contusions, lacerations, or bruises on head     Right Ear: External ear normal.      Left Ear: External ear normal.      Nose: Nose  normal.      Mouth/Throat:      Mouth: Mucous membranes are moist.      Pharynx: Oropharynx is clear.   Eyes:      General: No scleral icterus.     Extraocular Movements: Extraocular movements intact.      Pupils: Pupils are equal, round, and reactive to light.   Cardiovascular:      Rate and Rhythm: Normal rate and regular rhythm.      Pulses: Normal pulses.      Heart sounds: Normal heart sounds.   Pulmonary:      Effort: Pulmonary effort is normal.      Breath sounds: Normal breath sounds.   Abdominal:      General: Abdomen is flat. There is no distension.      Tenderness: There is no abdominal tenderness.   Musculoskeletal:      Cervical back: Normal range of motion and neck supple.      Right lower leg: No edema.      Left lower leg: No edema.      Comments: Hips no tenderness to palpation bilaterally  Mild pain with flexion/extension of left hip   Skin:     General: Skin is warm and dry.   Neurological:      General: No focal deficit present.      Mental Status: She is alert and oriented to person, place, and time.             Significant Labs: All pertinent labs within the past 24 hours have been reviewed.  CBC:   Recent Labs   Lab 03/25/24  1831 03/26/24  0540   WBC 7.75 6.49   HGB 13.0 12.2   HCT 40.7 37.8    342     CMP:   Recent Labs   Lab 03/25/24  1831      K 3.7      CO2 27      BUN 32*   CREATININE 1.36   CALCIUM 9.9   PROT 8.5*   ALBUMIN 4.5   BILITOT 0.4   ALKPHOS 70   AST 17   ALT 9*   ANIONGAP 11       Significant Imaging: I have reviewed all pertinent imaging results/findings within the past 24 hours.

## 2024-03-26 NOTE — ASSESSMENT & PLAN NOTE
Fall 5 days prior to admission, patient able to ambulate but shorter distances than before fall. 5 day history of left hip pain to movement, no acute contusions or lacerations visualized. Hip xray showed left subcapital femoral neck fracture confirmed on CT which showed nondisplaced left femoral neck fracture as well.    Plan:  Pre-operative evaluation with risk assessment              -           Scheduled for left hip pinning on 3/26 by Dr. Bloom  -           DASI score: 30.2 w/ Functional Capacity in METS: 6.45 (>4) with a revised cardiac index   risk of 10.1%.    - Not on antiplatelets/anticoagulation.   - no h/o blood dyscrasias or previous reaction to anesthesia   - is a smoker. 1 ppd.  - has prior h/o HLD/CAD w/ CVA. ASCVD: 13.7%. Continue medical management w/ atorvastatin 20  - has prior h/o DM. Last HgbA1C:~5.8  - has no prior h/o thyroid disease. TSH: pending  - has prior h/o HTN. BP: 124/59.   - has no prior h/o COPD/Asthma/GRETCHEN/OHS, suspected COPD on prior notes from previous providors. Does not use CPAP.   - has no prior h/o HF. Echo: pending  - BMI: Body mass index is 23.81 kg/m².              -           The patient is not medically optimized to proceed with (per ACC/AHA reshma-operative guidelines) a moderate risk surgery.    -consulted cardiology - recommend cardiology evaluation  -recommend ECHO  -recommend TSH  -can restart DVT PPX on POD1 per ortho     Provisional Diagnosis:  Unspecified Depression and Anxiety      Risk, Psychosocial and Contextual Factors:  Caregiver, Employment stress     Current  Treatment:  [facility]     Present Suicidal Behavior:  Denied     Verbal:  Denied         Attempt: Denied     Access to Weapons: Denied       Current Suicide Risk: Low, Moderate or High:    Low     Past Suicidal Behavior: Denied       Verbal:Denied     Attempt:Deneid     Self-Injurious/Self-Mutilation: Denied     Traumatic Event Within Past 2 Weeks:   Yes, patient's son was admitted to [facility]     Current Abuse: Denied     Legal: Denied     Violence: Denied     Protective Factors:  Family support, peer support, employment, housing, transportation, connection to services      Housing: Rented home         CPAP/Oxygen/Ambulation Difficulties:     Basic Vital Signs Normal?: Check with Patients Nurse prior to Calling Psychiatry    Critical Labs?: Check with Patients Nurse prior to Calling Psychiatry    Clinical Summary:      Patient is a 47year old  female who presents to the ED voluntarily  Reporting increased anxiety. Patient reports [facility]. Patient reports having thoughts that she is going to die. Patient states that she would not end her life. She feels as though the anxiety gets heavy enough that she may die. Patient states that she has been off her medication for 2 days. She states that her psychiatrist is out of the office until the 21st. Patient states that she does have an appointment. Homicidal thoughts and/or plans denied. No delusions noted. Hallucinations denied. AOD denied. Level of Care Disposition:      Consulted with medical provider. Patient is medically. Consulted with Dr. Leona Cifuentes. Patient to be seen outpatient.

## 2024-03-26 NOTE — PLAN OF CARE
Recommendations  Recommendation:   1. When medically acceptable, initiate cardiac/diabetic diet 2000 kcals   2. Once diet is initiated, add Boost Plus Glucose Control TID  3. Monitor weight and labs    Goals: PT will be on diet by RD follow up    Nutrition Goal Status: new  Communication of RD Recs:  (POC)

## 2024-03-26 NOTE — PROGRESS NOTES
03/25/24 2025   Admission   Initial VN Admission Questions Complete   Shift   Virtual Nurse - Patient Verbalized Approval Of Camera Use;VN Rounding   Safety/Activity   Patient Rounds bed in low position;call light in patient/parent reach;clutter free environment maintained;visualized patient;placement of personal items at bedside   Safety Promotion/Fall Prevention assistive device/personal item within reach;Fall Risk reviewed with patient/family;side rails raised x 2   Positioning   Body Position supine   Head of Bed (HOB) Positioning HOB at 30-45 degrees     VN cued in to pt's room with permission. Admission questions completed. Plan of care review deferred to bedside nurse-awaiting admission orders from MD. Pt denies any needs at this time. Call bell w/in reach. Instructed to call for needs/assist.

## 2024-03-26 NOTE — ASSESSMENT & PLAN NOTE
Patient with history of episodes of syncope since 2014, patient has a loop recorder visualized on CXR and confirmed on chart review. She had an episode of dizziness followed by fall with no LOC or seizures five days prior to admission. She denied any nausea/vomiting, chest pain, SOB associated with her dizziness.   Troponin negative, lactic acid negative, EKG on admit showed normal sinus rhythm with prolonged QTc of 506. On day of admit she denied any headache, dizziness, vision changes.     Plan:  Consult cardiology, appreciate recs  F/u echo

## 2024-03-26 NOTE — ASSESSMENT & PLAN NOTE
Chronic, controlled. Latest blood pressure and vitals reviewed-     Temp:  [97.4 °F (36.3 °C)-98.5 °F (36.9 °C)]   Pulse:  [73-91]   Resp:  [17-18]   BP: (114-160)/(59-84)   SpO2:  [96 %-100 %] .   Home meds for hypertension were reviewed and noted below.   Hypertension Medications               amLODIPine (NORVASC) 10 MG tablet Take 1 tablet (10 mg total) by mouth once daily.    losartan-hydrochlorothiazide 50-12.5 mg (HYZAAR) 50-12.5 mg per tablet Take 1 tablet by mouth once daily.          Will utilize p.r.n. blood pressure medication only if patient's blood pressure greater than 180/110 and she develops symptoms such as worsening chest pain or shortness of breath.    Plan:  Continue home amlodipine as it is a historic medication  Hold hyzaar as it is a more recent medication, consider adding if morning BP is elevated
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
## 2024-03-26 NOTE — NURSING
Patient received via stretcher AA0x4. Purewick in place. V/s in epic. Dr marr made aware of patient arrival. Safety ensured

## 2024-03-26 NOTE — ASSESSMENT & PLAN NOTE
Chronic, controlled. Latest blood pressure and vitals reviewed-     Temp:  [97.8 °F (36.6 °C)-98.5 °F (36.9 °C)]   Pulse:  [73-91]   Resp:  [17-18]   BP: (114-156)/(59-84)   SpO2:  [98 %-100 %] .   Home meds for hypertension were reviewed and noted below.   Hypertension Medications               amLODIPine (NORVASC) 10 MG tablet Take 1 tablet (10 mg total) by mouth once daily.    losartan-hydrochlorothiazide 50-12.5 mg (HYZAAR) 50-12.5 mg per tablet Take 1 tablet by mouth once daily.          Will utilize p.r.n. blood pressure medication only if patient's blood pressure greater than 180/110 and she develops symptoms such as worsening chest pain or shortness of breath.    Plan:  Continue home amlodipine as it is a historic medication  Hold hyzaar as it is a more recent medication, consider adding if morning BP is elevated

## 2024-03-26 NOTE — PROGRESS NOTES
[facility] Progress Note     Chief Complaint:  L nondisplaced hip fracture      HPI:  Pleasant 73F who sustained a GLF 5 days prior. Medical hx significant for DM (last A1c in system 6.7), HTN. CVA (no motor deficits-vertigo). She had a mechanical slip and fall in her kitchen, was able to ambulate afterwards. Presented today to outside ED for increased pain. CT w/ L subcapital femoral neck fx.     S: NAEON overnight. Pending cardiology consult for clearance / medical optimization prior to surgery.     O:   LLE:  No gross deformity   No open wounds or abrasions  No significant swelling or bruising  TTP about hip  NonTTP about thigh, knee, leg, ankle, foot  ROM hip deferred   ROM normal hip, knee, ankle  TA/gastroc/EHL/FHL intact with 5/5 strength  SILT grossly  2+ DP pulse    A/P:   -Pending cardiology consult   -Plan for CRPP L hip w/ Dr. Bloom this afternoon  -NPO  -NWB LLE     Josselyn Lawrence MD  [facility]

## 2024-03-26 NOTE — ASSESSMENT & PLAN NOTE
Fall 5 days prior to admission, patient able to ambulate but shorter distances than before fall. 5 day history of left hip pain to movement, no acute contusions or lacerations visualized. Hip xray showed left subcapital femoral neck fracture confirmed on CT which showed nondisplaced left femoral neck fracture as well.    Plan:  Pre-operative evaluation with risk assessment              -           Scheduled for left hip pinning on 3/26 by Dr. Bloom  -           DASI score: 30.2 w/ Functional Capacity in METS: 6.45 (>4) with a revised cardiac index   risk of 10.1%.    - Not on antiplatelets/anticoagulation.   - no h/o blood dyscrasias or previous reaction to anesthesia   - is a smoker. 1 ppd.  - has prior h/o HLD/CAD w/ CVA. ASCVD: 13.7%. Continue medical management w/ atorvastatin 20  - has prior h/o DM. Last HgbA1C:~5.8  - has no prior h/o thyroid disease. TSH: pending  - has prior h/o HTN. BP: 124/59.   - has no prior h/o COPD/Asthma/GRETCHEN/OHS, suspected COPD on prior notes from previous providors. Does not use CPAP.   - has no prior h/o HF. Echo: pending  - BMI: Body mass index is 23.81 kg/m².              -           The patient is not medically optimized to proceed with (per ACC/AHA reshma-operative guidelines) a moderate risk surgery.    -consulted cardiology - recommend cardiology evaluation  -recommend ECHO  -recommend TSH  -can restart DVT PPX on POD1 per ortho

## 2024-03-26 NOTE — ANESTHESIA PREPROCEDURE EVALUATION
03/26/2024  [facility]  Anesthesia Pre-Operative Evaluation         Patient Name: Elda Yoder  YOB: 1950  MRN: [id number]    SUBJECTIVE:     Pre-operative evaluation for Procedure(s) (LRB):  PINNING, HIP (Left)     03/26/2024    Elda Yoder is a 73 y.o. female w/ a significant PMHx of CVA ('14 with residual vertigo), PVD including ELEN, smoker, T2DM, HTN, hx of syncope(?), and femoral neck fx.  Patient now presents for the above procedure(s).    TTE 3/25/24:   Left Ventricle: There is normal systolic function. Grade I diastolic dysfunction.    Right Ventricle: Normal right ventricular cavity size. Systolic function is normal.    Aortic Valve: There is mild aortic valve sclerosis. There is mild aortic regurgitation with a centrally directed jet.    Mitral Valve: There is mild regurgitation with a centrally directed jet.    Pulmonary Artery: No pulmonary hypertension. The estimated pulmonary artery systolic pressure is 21 mmHg.    IVC/SVC: Normal venous pressure at 3 mmHg.       Patient Active Problem List   Diagnosis    Essential hypertension    Type 2 diabetes mellitus with diabetic polyneuropathy, with long-term current use of insulin    History of CVA (cerebrovascular accident)    Bladder incontinence    Anxiety    Insomnia    Osteoarthritis    Carotid artery stenosis    Vertigo as late effect of stroke    Syncope and collapse    Fracture of femoral neck, left    Hyperlipemia       Review of patient's allergies indicates:  No Known Allergies    Current Inpatient Medications:   amLODIPine  10 mg Oral Daily    atorvastatin  20 mg Oral Daily       No current facility-administered medications on file prior to encounter.     Current Outpatient Medications on File Prior to Encounter   Medication Sig Dispense Refill    ergocalciferol (ERGOCALCIFEROL) 50,000 unit Cap Take  "50,000 Units by mouth every 7 days.      JANUVIA 100 mg Tab Take 100 mg by mouth once daily.      losartan-hydrochlorothiazide 50-12.5 mg (HYZAAR) 50-12.5 mg per tablet Take 1 tablet by mouth once daily.      meclizine (ANTIVERT) 25 mg tablet Take 1 tablet (25 mg total) by mouth every 6 (six) hours as needed for Dizziness. (Patient taking differently: Take 25 mg by mouth 3 (three) times daily as needed for Dizziness.) 120 tablet 0    solifenacin (VESICARE) 10 MG tablet Take 1 tablet (10 mg total) by mouth once daily. 90 tablet 1    amLODIPine (NORVASC) 10 MG tablet Take 1 tablet (10 mg total) by mouth once daily. (Patient not taking: Reported on 3/25/2024) 90 tablet 3    blood sugar diagnostic (BLOOD GLUCOSE TEST) Strp Use 1-2 x a day to check glucoses before meals. 100 strip 11    blood-glucose meter kit Use as instructed 1 each 0    lancets Misc 1 Units by Misc.(Non-Drug; Combo Route) route daily as needed. 100 each 11    pen needle, diabetic 31 gauge x 1/4" Ndle Use to check insulin twice daily 100 each 11       No past surgical history on file.    OBJECTIVE:     Vital Signs Range (Last 24H):  Temp:  [36.3 °C (97.4 °F)-36.8 °C (98.2 °F)]   Pulse:  [67-91]   Resp:  [18]   BP: (114-160)/(59-78)   SpO2:  [96 %-100 %]       Significant Labs:  Lab Results   Component Value Date    WBC 6.49 03/26/2024    HGB 12.2 03/26/2024    HCT 37.8 03/26/2024     03/26/2024    CHOL 215 (H) 03/25/2024    TRIG 126 03/25/2024    HDL 51 03/25/2024    ALT 7 (L) 03/26/2024    AST 10 03/26/2024     03/26/2024    K 4.1 03/26/2024     03/26/2024    CREATININE 1.5 (H) 03/26/2024    BUN 33 (H) 03/26/2024    CO2 24 03/26/2024    TSH 0.726 03/26/2024    HGBA1C 5.8 (H) 03/25/2024       Diagnostic Studies: No relevant studies.    EKG:   Results for orders placed or performed during the hospital encounter of 03/25/24   EKG 12-lead    Collection Time: 03/25/24  6:37 PM   Result Value Ref Range    QRS Duration 78 ms    [facility] QTC " Calculation 506 ms    Narrative    Test Reason : Z01.818,    Vent. Rate : 089 BPM     Atrial Rate : 089 BPM     P-R Int : 154 ms          QRS Dur : 078 ms      QT Int : 416 ms       P-R-T Axes : 052 011 059 degrees     QTc Int : 506 ms    Normal sinus rhythm  Prolonged QT  Abnormal ECG  No previous ECGs available  Confirmed by Guzman Hallman MD ([id number]) on 3/26/2024 10:24:54 AM    Referred By: [email or web address]   SELF           Confirmed By:Guzman Hallman MD       ASSESSMENT/PLAN:           Pre-op Assessment    I have reviewed the Patient Summary Reports.     I have reviewed the Nursing Notes. I have reviewed the NPO Status.   I have reviewed the Medications.     Review of Systems  Anesthesia Hx:             Denies Family Hx of Anesthesia complications.    Denies Personal Hx of Anesthesia complications.                    Hematology/Oncology:    Oncology Normal                                   Cardiovascular:     Hypertension Valvular problems/Murmurs, AI, MR         PVD hyperlipidemia                             Pulmonary:  Pulmonary Normal                       Renal/:  Renal/ Normal                 Hepatic/GI:  Hepatic/GI Normal                 Musculoskeletal:  Arthritis               Neurological:   CVA Neuromuscular Disease,                                   Endocrine:  Diabetes               Physical Exam  General: Cooperative, Alert and Oriented    Airway:  Mallampati: III   Mouth Opening: Normal  TM Distance: Normal  Tongue: Normal  Neck ROM: Normal ROM        Anesthesia Plan  Type of Anesthesia, risks & benefits discussed:    Anesthesia Type: Gen ETT  Intra-op Monitoring Plan: Standard ASA Monitors  Post Op Pain Control Plan: multimodal analgesia  Induction:  IV  Airway Plan: Video, Post-Induction  Informed Consent: Informed consent signed with the Patient and all parties understand the risks and agree with anesthesia plan.  All questions answered.   ASA Score: 3  Day of Surgery Review of History &  Physical: H&P Update referred to the surgeon/provider.    Ready For Surgery From Anesthesia Perspective.     .

## 2024-03-26 NOTE — ASSESSMENT & PLAN NOTE
ASCVD (Atherosclerotic Cardiovascular Disease) 2013 Risk Calculator from AHA/ACC from [employer]  on 3/26/2024  ** All calculations should be rechecked by clinician prior to use **    RESULT SUMMARY:     Moderate- to high-intensity statin recommended because 10-year risk >7.5%    To view statin dosages by intensity, see Evidence section.    13.7%    Risk of cardiovascular event (coronary or stroke death or non-fatal MI or stroke) in next 10 years.    7.7%    10-year cardiovascular risk if risk factors were optimal.      INPUTS:  Age --> 73 years  Diabetes --> 0 = No  Sex --> 0 = Female  Smoker --> 1 = Yes  Total cholesterol --> 215 mg/dL  HDL cholesterol --> 51 mg/dL  Systolic blood pressure --> 114 mm Hg  Treatment for hypertension --> 0 = No  Race --> 1 = White    Plan:  Start atorvastatin 20

## 2024-03-26 NOTE — CONSULTS
[facility] - Med Surg  Adult Nutrition  Consult Note    SUMMARY     Recommendations  Recommendation:   1. When medically acceptable, initiate cardiac/diabetic diet 2000 kcals   2. Once diet is initiated, add Boost Plus Glucose Control TID  3. Monitor weight and labs    Goals: PT will be on diet by RD follow up    Nutrition Goal Status: new  Communication of RD Recs:  (POC)    Assessment and Plan  Nutrition Problem  Altered nutrition related labs     Related to (etiology):   DM2    Signs and Symptoms (as evidenced by):   Lab Results   Component Value Date    HGBA1C 5.8 (H) 03/25/2024     Interventions:  Collaboration with other providers   CHO consistent diet     Nutrition Diagnosis Status:   New    Reason for Assessment  Reason For Assessment: consult (decreased appetite, 18 lb weight loss in the past few weeks)  Diagnosis: other (see comments) (syncope and collapse)  Relevant Medical History: HTN, DM2, insomnia, bladder incontinence, osteoarthritis, CVA, anxiety  Interdisciplinary Rounds: did not attend  General Information Comments: Pt is currently NPO. Alan-19/skin intact. No meal intake noted. No recent weight history per chart review. Noted 18 lb weight loss in the past few weeks. Unable to conduct NFPE at time of visit.  Nutrition Discharge Planning: d/c oncardiac/diabetic diet.    Nutrition Risk Screen  Nutrition Risk Screen: unintentional loss of 10 lbs or more in the past 2 months    Nutrition/Diet History  Patient Reported Diet/Restrictions/Preferences: heart healthy, diabetic diet  Food Preferences: KEELY  Spiritual, Cultural Beliefs, Confucianist Practices, Values that Affect Care: no  Factors Affecting Nutritional Intake: decreased appetite    Nutrition Related Social Determinants of Health: SDOH: Unable to assess at this time.     Anthropometrics  Temp: 98 °F (36.7 °C)  Height Method: Stated  Height: 5' (152.4 cm)  Height (inches): 60 in  Weight Method: Stated  Weight: 55 kg (121 lb 4.1 oz)  Weight (lb):  121.25 lb  Ideal Body Weight (IBW), Female: 100 lb  % Ideal Body Weight, Female (lb): 121.25 %  BMI (Calculated): 23.7  BMI Grade: 18.5-24.9 - normal     Lab/Procedures/Meds  Pertinent Labs Reviewed: reviewed  Pertinent Labs Comments: BUN 33, Creatinine 1.5, GFR 37, ALT 7  Pertinent Medications Reviewed: reviewed  Pertinent Medications Comments: atorvastatin, amlodipine    Estimated/Assessed Needs  Weight Used For Calorie Calculations: 55 kg (121 lb 4.1 oz)  Energy Calorie Requirements (kcal): 1650 kcals (30 kcals/kg)  Energy Need Method: Kcal/kg  Protein Requirements: 55g (1g/kg)  Weight Used For Protein Calculations: 55 kg (121 lb 4.1 oz)     Estimated Fluid Requirement Method: RDA Method  RDA Method (mL): 1650  CHO Requirement: 200g    Nutrition Prescription Ordered  Current Diet Order: NPO    Evaluation of Received Nutrient/Fluid Intake  I/O: 290/850  Energy Calories Required: not meeting needs  Protein Required: not meeting needs  Fluid Required: not meeting needs  Comments: LBM-3/24/24  Tolerance: not tolerating  % Intake of Estimated Energy Needs: Other: NPO  % Meal Intake: NPO    Nutrition Risk  Level of Risk/Frequency of Follow-up: low (1x/weekly)       Monitor and Evaluation  Food and Nutrient Intake: energy intake, food and beverage intake  Food and Nutrient Adminstration: diet order  Anthropometric Measurements: weight, weight change, body mass index  Biochemical Data, Medical Tests and Procedures: electrolyte and renal panel, gastrointestinal profile, glucose/endocrine profile, lipid profile     Nutrition Follow-Up  RD Follow-up?: Yes

## 2024-03-26 NOTE — H&P
Family Medicine  History & Physical    Patient Name: Elda Yoder  MRN: [id number]  Patient Class: IP- Inpatient  Admission Date: 3/25/2024  Attending Physician: Dr. Glass  Primary Care Provider: Shelbi Funk      Patient information was obtained from patient and ER records.     Subjective:     Principal Problem:Syncope and collapse, Left femoral neck fracture    Chief Complaint:   Chief Complaint   Patient presents with    hip pain s/p fall     Pt arrived via EMS states she fell 5 days ago while she was in her kitchen on her tile floor and has left hip pain. Pt states she has been walking on it ever since but pain worse today. No obv deformity.         HPI: Patient is a 73 y.o.-year-old female w/ PMHx CVA in 2014, loop recorder since 2014 for syncope since 2014, DM (5.8 A1c 3/25/24), HTN, osteoarthritis who presents to the ED with reports of a fall five days ago. She states five days prior to admission she felt dizzy in her kitchen when she fell and hit her head and her left hip. She denies loss of consciousness, she states that it was a light bump which left no lacerations or contusions, denies vision changes, nausea, vomiting, chest pain or palpitations, or tongue biting when she fell. She has not been having nausea, vomiting, or vision changes for the past five days since fall. She endorses falling on her left hip as well which was initially painful but she has been able to ambulate short distances and is not tender to palpation. She denies any fevers, chills, shortness of breath since the fall. Of note, patient states that she was originally on amlodipine 10 for years and was recently started on hyzaar approximately 2-3 weeks ago and she has been compliant with all her medications. She does not recall what her blood pressure is on her home medications, but she states it is in the 170s when she is off of them.     In the ED, initial vitals Temp 98.5F, /84, HR 87, RR 17, SpO2 100% on room air.  "CBC was WNL, CMP was WNL. And Estimated Creatinine Clearance: 29.6 mL/min (based on SCr of 1.36 mg/dL).  CXR showed cardiac monitoring device, no cardiomegaly and no effusions, hip x-ray showed left subcapital femoral neck fracture, CT of hip confirmednondisplaced subcapital left femoral neck fracture, EKG showed normal sinus rhythm with prolonged QTc of 506.   [facility] admitted patient for evaluation for syncope and medical optimization for procedure for left femoral neck fracture.     Past Medical History:   Diagnosis Date    Anxiety     Bladder incontinence     Diabetes mellitus, type 2     History of CVA (cerebrovascular accident) 2015    Hypertension     Insomnia     Osteoarthritis        No past surgical history on file.    Review of patient's allergies indicates:  No Known Allergies    No current facility-administered medications on file prior to encounter.     Current Outpatient Medications on File Prior to Encounter   Medication Sig    ergocalciferol (ERGOCALCIFEROL) 50,000 unit Cap Take 50,000 Units by mouth every 7 days.    JANUVIA 100 mg Tab Take 100 mg by mouth once daily.    losartan-hydrochlorothiazide 50-12.5 mg (HYZAAR) 50-12.5 mg per tablet Take 1 tablet by mouth once daily.    meclizine (ANTIVERT) 25 mg tablet Take 1 tablet (25 mg total) by mouth every 6 (six) hours as needed for Dizziness. (Patient taking differently: Take 25 mg by mouth 3 (three) times daily as needed for Dizziness.)    solifenacin (VESICARE) 10 MG tablet Take 1 tablet (10 mg total) by mouth once daily.    amLODIPine (NORVASC) 10 MG tablet Take 1 tablet (10 mg total) by mouth once daily. (Patient not taking: Reported on 3/25/2024)    blood sugar diagnostic (BLOOD GLUCOSE TEST) Strp Use 1-2 x a day to check glucoses before meals.    blood-glucose meter kit Use as instructed    lancets Misc 1 Units by Misc.(Non-Drug; Combo Route) route daily as needed.    pen needle, diabetic 31 gauge x 1/4" Ndle Use to check insulin " twice daily    [DISCONTINUED] amlodipine-atorvastatin (CADUET) 10-10 mg per tablet Take 1 tablet by mouth once daily.    [DISCONTINUED] aspirin (ECOTRIN) 81 MG EC tablet Take 81 mg by mouth once daily.    [DISCONTINUED] diazePAM (VALIUM) 5 MG tablet Take 0.5 tablets (2.5 mg total) by mouth every 8 (eight) hours as needed (Vertigo refractory to meclizine).    [DISCONTINUED] gabapentin (NEURONTIN) 300 MG capsule Take 1 capsule (300 mg total) by mouth 3 (three) times daily.    [DISCONTINUED] LANTUS SOLOSTAR U-100 INSULIN glargine 100 units/mL (3mL) SubQ pen INJECT 10 UNITS INTO THE SKIN Q NIGHT    [DISCONTINUED] rosuvastatin (CRESTOR) 20 MG tablet Take 1 tablet (20 mg total) by mouth once daily.    [DISCONTINUED] traZODone (DESYREL) 50 MG tablet Take 1 tablet (50 mg total) by mouth every evening.    [DISCONTINUED] triamcinolone acetonide 0.1% (KENALOG) 0.1 % cream Apply topically 2 (two) times daily.    [DISCONTINUED] venlafaxine (EFFEXOR-XR) 75 MG 24 hr capsule Take 1 capsule (75 mg total) by mouth once daily.     Family History       Problem Relation (Age of Onset)    Cancer Paternal Uncle    Coronary artery disease Mother    Diabetes Mother, Father    Heart failure Daughter    Hypertension Mother          Tobacco Use    Smoking status: Heavy Smoker     Current packs/day: 1.00     Types: Cigarettes    Smokeless tobacco: Never   Substance and Sexual Activity    Alcohol use: Not Currently     Alcohol/week: 0.0 standard drinks of alcohol    Drug use: Not Currently    Sexual activity: Not on file     Review of Systems   Constitutional:  Negative for activity change, appetite change, chills, fatigue and fever.   Eyes:  Negative for visual disturbance.   Respiratory:  Negative for cough, chest tightness, shortness of breath, wheezing and stridor.    Cardiovascular:  Negative for chest pain, palpitations and leg swelling.   Gastrointestinal:  Negative for diarrhea, nausea and vomiting.   Genitourinary:  Negative for  dysuria.   Skin:  Negative for color change, pallor and rash.   Neurological:  Positive for dizziness, syncope and light-headedness. Negative for seizures, facial asymmetry and headaches.   Psychiatric/Behavioral:  Negative for agitation and confusion.    All other systems reviewed and are negative.    Objective:     Vital Signs (Most Recent):  Temp: 98.2 °F (36.8 °C) (03/25/24 2317)  Pulse: 73 (03/25/24 2317)  Resp: 18 (03/25/24 2317)  BP: (!) 124/59 (03/25/24 2317)  SpO2: 98 % (03/25/24 2317) Vital Signs (24h Range):  Temp:  [97.8 °F (36.6 °C)-98.5 °F (36.9 °C)] 98.2 °F (36.8 °C)  Pulse:  [73-91] 73  Resp:  [17-18] 18  SpO2:  [98 %-100 %] 98 %  BP: (114-156)/(59-84) 124/59     Weight: 55.3 kg (121 lb 14.6 oz)  Body mass index is 23.81 kg/m².     Physical Exam  Vitals and nursing note reviewed.   Constitutional:       General: She is not in acute distress.     Appearance: Normal appearance. She is normal weight. She is not ill-appearing, toxic-appearing or diaphoretic.   HENT:      Head: Normocephalic and atraumatic.      Comments: No contusions, lacerations, or bruises on head     Right Ear: External ear normal.      Left Ear: External ear normal.      Nose: Nose normal.      Mouth/Throat:      Mouth: Mucous membranes are moist.      Pharynx: Oropharynx is clear.   Eyes:      General: No scleral icterus.     Extraocular Movements: Extraocular movements intact.      Pupils: Pupils are equal, round, and reactive to light.   Cardiovascular:      Rate and Rhythm: Normal rate and regular rhythm.      Pulses: Normal pulses.      Heart sounds: Normal heart sounds.   Pulmonary:      Effort: Pulmonary effort is normal.      Breath sounds: Normal breath sounds.   Abdominal:      General: Abdomen is flat. There is no distension.      Tenderness: There is no abdominal tenderness.   Musculoskeletal:      Cervical back: Normal range of motion and neck supple.      Right lower leg: No edema.      Left lower leg: No edema.       Comments: Hips no tenderness to palpation bilaterally  Mild pain with flexion/extension of left hip   Skin:     General: Skin is warm and dry.   Neurological:      General: No focal deficit present.      Mental Status: She is alert and oriented to person, place, and time.              CRANIAL NERVES     CN III, IV, VI   Pupils are equal, round, and reactive to light.       Significant Labs: All pertinent labs within the past 24 hours have been reviewed.  CBC:   Recent Labs   Lab 03/25/24  1831   WBC 7.75   HGB 13.0   HCT 40.7        CMP:   Recent Labs   Lab 03/25/24  1831      K 3.7      CO2 27      BUN 32*   CREATININE 1.36   CALCIUM 9.9   PROT 8.5*   ALBUMIN 4.5   BILITOT 0.4   ALKPHOS 70   AST 17   ALT 9*   ANIONGAP 11       Significant Imaging: I have reviewed all pertinent imaging results/findings within the past 24 hours.  CXR: I have reviewed all pertinent results/findings within the past 24 hours and my personal findings are:  loop recorder visualized, no cardiomegaly and no pulmonary findings  EKG: I have reviewed all pertinent results/findings within the past 24 hours and my personal findings are: normal sinus rhythm, prolonged QTc of  506  Assessment/Plan:     * Syncope and collapse  Patient with history of episodes of syncope since 2014, patient has a loop recorder visualized on CXR and confirmed on chart review. She had an episode of dizziness followed by fall with no LOC or seizures five days prior to admission. She denied any nausea/vomiting, chest pain, SOB associated with her dizziness.   Troponin negative, lactic acid negative, EKG on admit showed normal sinus rhythm with prolonged QTc of 506. On day of admit she denied any headache, dizziness, vision changes.     Plan:  Consult cardiology, appreciate recs  F/u echo        Fracture of femoral neck, left  Fall 5 days prior to admission, patient able to ambulate but shorter distances than before fall. 5 day history of left  hip pain to movement, no acute contusions or lacerations visualized. Hip xray showed left subcapital femoral neck fracture confirmed on CT which showed nondisplaced left femoral neck fracture as well.    Plan:  Pre-operative evaluation with risk assessment              -           Scheduled for left hip pinning on 3/26 by Dr. Bloom  -           DASI score: 30.2 w/ Functional Capacity in METS: 6.45 (>4) with a revised cardiac index   risk of 10.1%.    - Not on antiplatelets/anticoagulation.   - no h/o blood dyscrasias or previous reaction to anesthesia   - is a smoker. 1 ppd.  - has prior h/o HLD/CAD w/ CVA. ASCVD: 13.7%. Continue medical management w/ atorvastatin 20  - has prior h/o DM. Last HgbA1C:~5.8  - has no prior h/o thyroid disease. TSH: pending  - has prior h/o HTN. BP: 124/59.   - has no prior h/o COPD/Asthma/GRETCHEN/OHS, suspected COPD on prior notes from previous providors. Does not use CPAP.   - has no prior h/o HF. Echo: pending  - BMI: Body mass index is 23.81 kg/m².              -           The patient is not medically optimized to proceed with (per ACC/AHA reshma-operative guidelines) a moderate risk surgery.    -consulted cardiology - recommend cardiology evaluation  -recommend ECHO  -recommend TSH  -can restart DVT PPX on POD1 per ortho      Hyperlipemia  ASCVD (Atherosclerotic Cardiovascular Disease) 2013 Risk Calculator from AHA/ACC from [employer]  on 3/26/2024  ** All calculations should be rechecked by clinician prior to use **    RESULT SUMMARY:     Moderate- to high-intensity statin recommended because 10-year risk >7.5%    To view statin dosages by intensity, see Evidence section.    13.7%    Risk of cardiovascular event (coronary or stroke death or non-fatal MI or stroke) in next 10 years.    7.7%    10-year cardiovascular risk if risk factors were optimal.      INPUTS:  Age --> 73 years  Diabetes --> 0 = No  Sex --> 0 = Female  Smoker --> 1 = Yes  Total cholesterol --> 215 mg/dL  HDL  cholesterol --> 51 mg/dL  Systolic blood pressure --> 114 mm Hg  Treatment for hypertension --> 0 = No  Race --> 1 = White    Plan:  Start atorvastatin 20    Osteoarthritis        History of CVA (cerebrovascular accident)  Patient with history of CVA in 2014, long history of vertigo after CVA, could be residual effect after CVA.     Plan:  Atorvastatin 20    Essential hypertension  Chronic, controlled. Latest blood pressure and vitals reviewed-     Temp:  [97.8 °F (36.6 °C)-98.5 °F (36.9 °C)]   Pulse:  [73-91]   Resp:  [17-18]   BP: (114-156)/(59-84)   SpO2:  [98 %-100 %] .   Home meds for hypertension were reviewed and noted below.   Hypertension Medications               amLODIPine (NORVASC) 10 MG tablet Take 1 tablet (10 mg total) by mouth once daily.    losartan-hydrochlorothiazide 50-12.5 mg (HYZAAR) 50-12.5 mg per tablet Take 1 tablet by mouth once daily.          Will utilize p.r.n. blood pressure medication only if patient's blood pressure greater than 180/110 and she develops symptoms such as worsening chest pain or shortness of breath.    Plan:  Continue home amlodipine as it is a historic medication  Hold hyzaar as it is a more recent medication, consider adding if morning BP is elevated      VTE Risk Mitigation (From admission, onward)           Ordered     Reason for No Pharmacological VTE Prophylaxis  Once        Question:  Reasons:  Answer:  Physician Provided (leave comment)  Comment:  possible surgery    03/25/24 2129     IP VTE HIGH RISK PATIENT  Once         03/25/24 2129     Place sequential compression device  Until discontinued         03/25/24 2129                     Too Hsu MD  [facility], PGY-1  03/26/2024

## 2024-03-26 NOTE — HPI
Patient is a 73 y.o.-year-old female w/ PMHx CVA in 2014, loop recorder since 2014 for syncope since 2014, DM (5.8 A1c 3/25/24), HTN, osteoarthritis who presents to the ED with reports of a fall five days ago. She states five days prior to admission she felt dizzy in her kitchen when she fell and hit her head and her left hip. She denies loss of consciousness, she states that it was a light bump which left no lacerations or contusions, denies vision changes, nausea, vomiting, chest pain or palpitations, or tongue biting when she fell. She has not been having nausea, vomiting, or vision changes for the past five days since fall. She endorses falling on her left hip as well which was initially painful but she has been able to ambulate short distances and is not tender to palpation. She denies any fevers, chills, shortness of breath since the fall. Of note, patient states that she was originally on amlodipine 10 for years and was recently started on hyzaar approximately 2-3 weeks ago and she has been compliant with all her medications. She does not recall what her blood pressure is on her home medications, but she states it is in the 170s when she is off of them.     In the ED, initial vitals Temp 98.5F, /84, HR 87, RR 17, SpO2 100% on room air. CBC was WNL, CMP was WNL. And Estimated Creatinine Clearance: 29.6 mL/min (based on SCr of 1.36 mg/dL).  CXR showed cardiac monitoring device, no cardiomegaly and no effusions, hip x-ray showed left subcapital femoral neck fracture, CT of hip confirmednondisplaced subcapital left femoral neck fracture, EKG showed normal sinus rhythm with prolonged QTc of 506.   [facility] admitted patient for evaluation for syncope and medical optimization for procedure for left femoral neck fracture.

## 2024-03-27 LAB
ALBUMIN SERPL BCP-MCNC: 3.5 G/DL (ref 3.5–5.2)
ALP SERPL-CCNC: 68 U/L (ref 55–135)
ALT SERPL W/O P-5'-P-CCNC: <5 U/L (ref 10–44)
ANION GAP SERPL CALC-SCNC: 10 MMOL/L (ref 8–16)
AST SERPL-CCNC: 13 U/L (ref 10–40)
BASOPHILS # BLD AUTO: 0.03 K/UL (ref 0–0.2)
BASOPHILS NFR BLD: 0.3 % (ref 0–1.9)
BILIRUB SERPL-MCNC: 0.3 MG/DL (ref 0.1–1)
BUN SERPL-MCNC: 34 MG/DL (ref 8–23)
CALCIUM SERPL-MCNC: 9.2 MG/DL (ref 8.7–10.5)
CHLORIDE SERPL-SCNC: 108 MMOL/L (ref 95–110)
CO2 SERPL-SCNC: 23 MMOL/L (ref 23–29)
CREAT SERPL-MCNC: 1.7 MG/DL (ref 0.5–1.4)
DIFFERENTIAL METHOD BLD: ABNORMAL
EOSINOPHIL # BLD AUTO: 0 K/UL (ref 0–0.5)
EOSINOPHIL NFR BLD: 0 % (ref 0–8)
ERYTHROCYTE [DISTWIDTH] IN BLOOD BY AUTOMATED COUNT: 13.3 % (ref 11.5–14.5)
EST. GFR  (NO RACE VARIABLE): 32 ML/MIN/1.73 M^2
GLUCOSE SERPL-MCNC: 200 MG/DL (ref 70–110)
HCT VFR BLD AUTO: 36.5 % (ref 37–48.5)
HGB BLD-MCNC: 11.7 G/DL (ref 12–16)
IMM GRANULOCYTES # BLD AUTO: 0.03 K/UL (ref 0–0.04)
IMM GRANULOCYTES NFR BLD AUTO: 0.3 % (ref 0–0.5)
LYMPHOCYTES # BLD AUTO: 0.9 K/UL (ref 1–4.8)
LYMPHOCYTES NFR BLD: 10.6 % (ref 18–48)
MAGNESIUM SERPL-MCNC: 2 MG/DL (ref 1.6–2.6)
MCH RBC QN AUTO: 27.3 PG (ref 27–31)
MCHC RBC AUTO-ENTMCNC: 32.1 G/DL (ref 32–36)
MCV RBC AUTO: 85 FL (ref 82–98)
MONOCYTES # BLD AUTO: 0.2 K/UL (ref 0.3–1)
MONOCYTES NFR BLD: 2.1 % (ref 4–15)
NEUTROPHILS # BLD AUTO: 7.6 K/UL (ref 1.8–7.7)
NEUTROPHILS NFR BLD: 86.7 % (ref 38–73)
NRBC BLD-RTO: 0 /100 WBC
PHOSPHATE SERPL-MCNC: 4 MG/DL (ref 2.7–4.5)
PLATELET # BLD AUTO: 321 K/UL (ref 150–450)
PMV BLD AUTO: 10.1 FL (ref 9.2–12.9)
POCT GLUCOSE: 113 MG/DL (ref 70–110)
POCT GLUCOSE: 164 MG/DL (ref 70–110)
POCT GLUCOSE: 181 MG/DL (ref 70–110)
POCT GLUCOSE: 225 MG/DL (ref 70–110)
POTASSIUM SERPL-SCNC: 4.6 MMOL/L (ref 3.5–5.1)
PROT SERPL-MCNC: 6.9 G/DL (ref 6–8.4)
RBC # BLD AUTO: 4.29 M/UL (ref 4–5.4)
SODIUM SERPL-SCNC: 141 MMOL/L (ref 136–145)
WBC # BLD AUTO: 8.75 K/UL (ref 3.9–12.7)

## 2024-03-27 PROCEDURE — 97162 PT EVAL MOD COMPLEX 30 MIN: CPT

## 2024-03-27 PROCEDURE — 85025 COMPLETE CBC W/AUTO DIFF WBC: CPT | Performed by: STUDENT IN AN ORGANIZED HEALTH CARE EDUCATION/TRAINING PROGRAM

## 2024-03-27 PROCEDURE — 25000003 PHARM REV CODE 250

## 2024-03-27 PROCEDURE — 84100 ASSAY OF PHOSPHORUS: CPT | Performed by: STUDENT IN AN ORGANIZED HEALTH CARE EDUCATION/TRAINING PROGRAM

## 2024-03-27 PROCEDURE — 97166 OT EVAL MOD COMPLEX 45 MIN: CPT

## 2024-03-27 PROCEDURE — 63600175 PHARM REV CODE 636 W HCPCS: Performed by: HOSPITALIST

## 2024-03-27 PROCEDURE — 83735 ASSAY OF MAGNESIUM: CPT | Performed by: STUDENT IN AN ORGANIZED HEALTH CARE EDUCATION/TRAINING PROGRAM

## 2024-03-27 PROCEDURE — 97116 GAIT TRAINING THERAPY: CPT

## 2024-03-27 PROCEDURE — 80053 COMPREHEN METABOLIC PANEL: CPT | Performed by: STUDENT IN AN ORGANIZED HEALTH CARE EDUCATION/TRAINING PROGRAM

## 2024-03-27 PROCEDURE — 97530 THERAPEUTIC ACTIVITIES: CPT

## 2024-03-27 PROCEDURE — 11000001 HC ACUTE MED/SURG PRIVATE ROOM

## 2024-03-27 PROCEDURE — 36415 COLL VENOUS BLD VENIPUNCTURE: CPT | Performed by: STUDENT IN AN ORGANIZED HEALTH CARE EDUCATION/TRAINING PROGRAM

## 2024-03-27 PROCEDURE — 63600175 PHARM REV CODE 636 W HCPCS: Performed by: STUDENT IN AN ORGANIZED HEALTH CARE EDUCATION/TRAINING PROGRAM

## 2024-03-27 PROCEDURE — 63600175 PHARM REV CODE 636 W HCPCS

## 2024-03-27 PROCEDURE — 25000003 PHARM REV CODE 250: Performed by: HOSPITALIST

## 2024-03-27 PROCEDURE — 25000003 PHARM REV CODE 250: Performed by: STUDENT IN AN ORGANIZED HEALTH CARE EDUCATION/TRAINING PROGRAM

## 2024-03-27 RX ORDER — MUPIROCIN 20 MG/G
OINTMENT TOPICAL 2 TIMES DAILY
Status: DISCONTINUED | OUTPATIENT
Start: 2024-03-27 | End: 2024-03-28 | Stop reason: HOSPADM

## 2024-03-27 RX ORDER — POLYETHYLENE GLYCOL 3350 17 G/17G
17 POWDER, FOR SOLUTION ORAL DAILY
Status: DISCONTINUED | OUTPATIENT
Start: 2024-03-27 | End: 2024-03-28 | Stop reason: HOSPADM

## 2024-03-27 RX ORDER — CALCIUM CARBONATE 200(500)MG
500 TABLET,CHEWABLE ORAL 3 TIMES DAILY PRN
Status: DISCONTINUED | OUTPATIENT
Start: 2024-03-27 | End: 2024-03-28 | Stop reason: HOSPADM

## 2024-03-27 RX ORDER — ASPIRIN 81 MG/1
81 TABLET ORAL 2 TIMES DAILY
Status: DISCONTINUED | OUTPATIENT
Start: 2024-03-27 | End: 2024-03-28 | Stop reason: HOSPADM

## 2024-03-27 RX ADMIN — INSULIN ASPART 2 UNITS: 100 INJECTION, SOLUTION INTRAVENOUS; SUBCUTANEOUS at 06:03

## 2024-03-27 RX ADMIN — ACETAMINOPHEN 650 MG: 325 TABLET ORAL at 08:03

## 2024-03-27 RX ADMIN — CALCIUM CARBONATE (ANTACID) CHEW TAB 500 MG 500 MG: 500 CHEW TAB at 08:03

## 2024-03-27 RX ADMIN — ATORVASTATIN CALCIUM 20 MG: 20 TABLET, FILM COATED ORAL at 08:03

## 2024-03-27 RX ADMIN — CEFTRIAXONE SODIUM 1 G: 1 INJECTION, POWDER, FOR SOLUTION INTRAMUSCULAR; INTRAVENOUS at 11:03

## 2024-03-27 RX ADMIN — MUPIROCIN: 20 OINTMENT TOPICAL at 11:03

## 2024-03-27 RX ADMIN — POLYETHYLENE GLYCOL 3350 17 G: 17 POWDER, FOR SOLUTION ORAL at 02:03

## 2024-03-27 RX ADMIN — CEFAZOLIN SODIUM 2 G: 2 SOLUTION INTRAVENOUS at 06:03

## 2024-03-27 RX ADMIN — ASPIRIN 81 MG: 81 TABLET, COATED ORAL at 08:03

## 2024-03-27 RX ADMIN — OXYCODONE HYDROCHLORIDE AND ACETAMINOPHEN 1 TABLET: 5; 325 TABLET ORAL at 03:03

## 2024-03-27 RX ADMIN — MUPIROCIN: 20 OINTMENT TOPICAL at 09:03

## 2024-03-27 RX ADMIN — INSULIN ASPART 1 UNITS: 100 INJECTION, SOLUTION INTRAVENOUS; SUBCUTANEOUS at 09:03

## 2024-03-27 RX ADMIN — OXYCODONE HYDROCHLORIDE AND ACETAMINOPHEN 1 TABLET: 5; 325 TABLET ORAL at 02:03

## 2024-03-27 RX ADMIN — AMLODIPINE BESYLATE 10 MG: 5 TABLET ORAL at 08:03

## 2024-03-27 RX ADMIN — OXYCODONE HYDROCHLORIDE AND ACETAMINOPHEN 1 TABLET: 5; 325 TABLET ORAL at 08:03

## 2024-03-27 RX ADMIN — ASPIRIN 81 MG: 81 TABLET, COATED ORAL at 02:03

## 2024-03-27 NOTE — ASSESSMENT & PLAN NOTE
Patient with history of episodes of syncope since 2014, patient has a loop recorder visualized on CXR and confirmed on chart review. She had an episode of dizziness followed by fall with no LOC or seizures five days prior to admission. She denied any nausea/vomiting, chest pain, SOB associated with her dizziness.   Troponin negative, lactic acid negative, EKG on admit showed normal sinus rhythm with prolonged QTc of 506. On day of admit she denied any headache, dizziness, vision changes.   Echo reviewed, patient went to surgery w/Cards optimization... no need for further Cards work up    Plan:

## 2024-03-27 NOTE — SUBJECTIVE & OBJECTIVE
Interval History:   NAEON. VSSAF RA. Surg well tolerated pain controlled on Norco5 prns. Pt to work w/PT, Perez out after ambulation. Started ASA 81mg bid, per Ortho.    Review of Systems   Constitutional:  Negative for fever.   Respiratory:  Negative for cough and shortness of breath.    Cardiovascular:  Negative for chest pain and palpitations.   Gastrointestinal:  Negative for abdominal pain, diarrhea, nausea and vomiting.   Genitourinary:  Negative for dysuria and frequency.   Musculoskeletal:  Positive for arthralgias and myalgias.   Neurological:  Negative for dizziness and headaches.     Objective:     Vital Signs (Most Recent):  Temp: 98.6 °F (37 °C) (03/27/24 1123)  Pulse: 87 (03/27/24 1212)  Resp: 16 (03/27/24 1123)  BP: 115/60 (03/27/24 1123)  SpO2: 95 % (03/27/24 1123) Vital Signs (24h Range):  Temp:  [97.6 °F (36.4 °C)-98.7 °F (37.1 °C)] 98.6 °F (37 °C)  Pulse:  [70-87] 87  Resp:  [11-18] 16  SpO2:  [93 %-100 %] 95 %  BP: (115-175)/(60-92) 115/60     Weight: 55 kg (121 lb 4.1 oz)  Body mass index is 23.68 kg/m².    Intake/Output Summary (Last 24 hours) at 3/27/2024 1251  Last data filed at 3/27/2024 0617  Gross per 24 hour   Intake 623.21 ml   Output 360 ml   Net 263.21 ml         Physical Exam  Constitutional:       Appearance: Normal appearance.   HENT:      Head: Normocephalic and atraumatic.   Cardiovascular:      Rate and Rhythm: Normal rate and regular rhythm.      Pulses: Normal pulses.      Heart sounds: Normal heart sounds. No murmur heard.  Pulmonary:      Effort: Pulmonary effort is normal. No respiratory distress.      Breath sounds: Normal breath sounds. No stridor. No wheezing, rhonchi or rales.   Abdominal:      General: Abdomen is flat.      Palpations: Abdomen is soft.      Tenderness: There is no abdominal tenderness.   Musculoskeletal:      Right lower leg: No edema.      Left lower leg: No edema.      Comments: Tenderness to L hip  Bandaged  Warm NV intact    Neurological:       General: No focal deficit present.      Mental Status: She is alert and oriented to person, place, and time.   Psychiatric:         Mood and Affect: Mood normal.         Behavior: Behavior normal.         Thought Content: Thought content normal.         Judgment: Judgment normal.             Significant Labs: All pertinent labs within the past 24 hours have been reviewed.  CBC:   Recent Labs   Lab 03/25/24  1831 03/26/24  0540 03/27/24  0333   WBC 7.75 6.49 8.75   HGB 13.0 12.2 11.7*   HCT 40.7 37.8 36.5*    342 321     CMP:   Recent Labs   Lab 03/25/24  1831 03/26/24  0540 03/27/24  0333    143 141   K 3.7 4.1 4.6    108 108   CO2 27 24 23    99 200*   BUN 32* 33* 34*   CREATININE 1.36 1.5* 1.7*   CALCIUM 9.9 9.6 9.2   PROT 8.5* 7.0 6.9   ALBUMIN 4.5 3.7 3.5   BILITOT 0.4 0.3 0.3   ALKPHOS 70 60 68   AST 17 10 13   ALT 9* 7* <5*   ANIONGAP 11 11 10       Significant Imaging: I have reviewed all pertinent imaging results/findings within the past 24 hours.

## 2024-03-27 NOTE — ASSESSMENT & PLAN NOTE
Chronic, controlled. Latest blood pressure and vitals reviewed-     Temp:  [97.6 °F (36.4 °C)-98.7 °F (37.1 °C)]   Pulse:  [70-87]   Resp:  [11-18]   BP: (115-175)/(60-92)   SpO2:  [93 %-100 %] .   Home meds for hypertension were reviewed and noted below.   Hypertension Medications               amLODIPine (NORVASC) 10 MG tablet Take 1 tablet (10 mg total) by mouth once daily.    losartan-hydrochlorothiazide 50-12.5 mg (HYZAAR) 50-12.5 mg per tablet Take 1 tablet by mouth once daily.          Will utilize p.r.n. blood pressure medication only if patient's blood pressure greater than 180/110 and she develops symptoms such as worsening chest pain or shortness of breath.  BP well controlled today    Plan:  Continue home amlodipine as it is a historic medication  Hold hyzaar as it is a more recent medication, consider adding if morning BP is elevated

## 2024-03-27 NOTE — OP NOTE
[facility] Orthopaedic Surgery    Operative Note    Date of Service: 3/26/2024    Pre-Operative Diagnosis:   1.L closed nondisplaced subcapital hip fracture     Post-Operative Diagnosis: Same    Procedure(s):   1. LEFT hip CRPP    Anesthesia: General     Surgeon: MD Wolf, was present and scrubbed for the key portions of the procedure.     Assistant(s):  MD Howard    Indications  Patient is a 73 y.o.female ground level fall 5 days ago.  She is able to weight bear following however had increased pain causing her to present to the emergency department yesterday.  Found to have a left nondisplaced subcapital femoral neck fracture on CT scan.  She was admitted to Medicine for preop risk stratification and optimization, she underwent cardiology evaluation and echocardiogram with no further testing indicated.  Given her injury we offered her left hip CRPP. The patient was checked again in the Holding Room. The risks, benefits, complications, treatment options, and expected outcomes were reviewed again with the patient. The patient has elected to proceed with above listed procedure(s).  The risks and potential complications include but are not limited to infection, nerve injury, vascular injury, persistent pain, potential skin necrosis, deep vein thrombosis, possible pulmonary embolus, complications of the anesthetics and failure of the implants with potential need for future surgery to remove or revise.  The patient concurred with the proposed plan, giving informed consent.  The site of surgery was identified by the patient and properly noted/marked by me.    Implant:   1. Synthes 6.5 cannulated partially threaded screws, 75/75/70    Procedure Details:   The patient was taken to Operating Room #5.  A Time-Out was held and the patient, location and procedure(s) were verified and agreed upon by all members of the operating room staff.  Prophylacic antibiotics were given.The patient was given general anesthesia.     Following the  successful induction of anesthesia the patient was placed supine on the fracture table.  The well leg with scissor to be out of the way of fluoroscopy. The left lower extremity was prepped and draped in normal sterile manner. A lateral incision was planned using fluoroscopy, and an incision made, extending through the IT band down to bone. Soft tissue was cleared away with a Iglesias elevator.     Guidepin was placed along the inferior border of the femoral neck and checked on the AP and lateral. The parallel guide was then used to place two additional guidewires for anterior superior and posterior superior screws in an inverted triangle configuration.  Fluoroscopy was used to check adequate positioning on the AP and lateral. Depth gauge was used to measure lengths of the screws at 75, 75, and 70. Screws were placed and final tightened by hand. Positioning on fluoroscopy was appropriate.     #1 vicryl was used to close the IT band and fascia as well as approximate deep subcutaneous tissue.  Layered closure then followed with 2-0 Vicryl, Dermabond, Steri-Strips.  A sterile dressing was placed.    Instrument, sponge, and needle counts were correct prior to wound closure and at the conclusion of the case.     The patient was awoken from anesthesia, tolerated the procedure well and taken to recovery in stable condition.       Findings:   1.  Left nondisplaced femoral neck fracture    Estimated blood loss: 50cc    Drains:  None    Total IV fluids: per anesthesia records    Specimens:   1.  None    Complications: None, pt tolerated the procedure well    Disposition: Awakened from anesthesia, and taken to the recovery room in a stable condition, having suffered no apparent untoward event     Condition: Stable     Josselyn Lawrence MD  [facility]

## 2024-03-27 NOTE — ANESTHESIA PROCEDURE NOTES
Intubation    Date/Time: 3/26/2024 6:33 PM    Performed by: Gloria Simpson CRNA  Authorized by: Too De La Rosa MD    Intubation:     Induction:  Intravenous    Intubated:  Postinduction    Mask Ventilation:  Easy mask    Attempts:  1    Attempted By:  CRNA    Method of Intubation:  Video laryngoscopy    Blade:  Thornton 3    Laryngeal View Grade: Grade I - full view of cords      Difficult Airway Encountered?: No      Complications:  None    Airway Device:  Oral endotracheal tube    Airway Device Size:  7.0    Style/Cuff Inflation:  Cuffed (inflated to minimal occlusive pressure)    Tube secured:  21    Secured at:  The teeth    Placement Verified By:  Capnometry    Complicating Factors:  None    Findings Post-Intubation:  BS equal bilateral and atraumatic/condition of teeth unchanged

## 2024-03-27 NOTE — TRANSFER OF CARE
Anesthesia Transfer of Care Note    Patient: Elda Yoder    Procedure(s) Performed: Procedure(s) (LRB):  PINNING, HIP (Left)    Patient location: PACU    Anesthesia Type: general    Transport from OR: Transported from OR on 6-10 L/min O2 by face mask with adequate spontaneous ventilation    Post pain: adequate analgesia    Post assessment: no apparent anesthetic complications and tolerated procedure well    Post vital signs: stable    Level of consciousness: awake    Nausea/Vomiting: no nausea/vomiting    Complications: none    Transfer of care protocol was followed      Last vitals: Visit Vitals  BP (!) 137/92   Pulse 73   Temp 37.1 °C (98.7 °F)   Resp 18   Ht 5' (1.524 m)   Wt 55 kg (121 lb 4.1 oz)   SpO2 98%   BMI 23.68 kg/m²

## 2024-03-27 NOTE — PT/OT/SLP EVAL
"Physical Therapy Evaluation    Patient Name:  Elda Yoder   MRN:  [id number]    Recommendations:     Discharge Recommendations: Moderate Intensity Therapy   Discharge Equipment Recommendations: walker, rolling, bath bench   Barriers to discharge: Inaccessible home, Decreased caregiver support, and currently requires assist with mobility    Assessment:     Elda Yoder is a 73 y.o. female admitted with a medical diagnosis of Syncope and collapse.  She presents with the following impairments/functional limitations: weakness, gait instability, decreased upper extremity function, impaired endurance, impaired balance, decreased lower extremity function, impaired sensation, impaired self care skills, pain, impaired skin, orthopedic precautions, impaired functional mobility, edema     Patient seen for physical therapy evaluation on this date, co-evaluation with occupational therapy.  Patient will benefit from inpatient physical therapy to address functional limitations.  Anticipate patient will benefit from moderate intensity therapy to address limitations.  Patient will also benefit from a RW and Tub bench for home use..    Rehab Prognosis: Good; patient would benefit from acute skilled PT services to address these deficits and reach maximum level of function.    Recent Surgery: Procedure(s) (LRB):  PINNING, HIP (Left) 1 Day Post-Op    Plan:     During this hospitalization, patient to be seen daily to address the identified rehab impairments via gait training, therapeutic activities, therapeutic exercises, neuromuscular re-education and progress toward the following goals:    Plan of Care Expires:  04/26/24    Subjective     Chief Complaint: "My leg hurts"  Reports decreased pain after gait  Patient/Family Comments/goals: to return to PLOF  Pain/Comfort:  Pain Rating 1: 5/10  Location - Side 1: Left  Location 1: leg  Pain Addressed 1: Pre-medicate for activity, Reposition, Distraction, Cessation of Activity, Nurse " "notified  Pain Rating Post-Intervention 1: 4/10    Patients cultural, spiritual, Restorationist conflicts given the current situation: no    Living Environment:  Patient lives with daughter and friend in a 1 SH, 3 JERARDO with B HR, T/S with shower chair in bathroom.    Prior to admission, patients level of function was Independent ambulator, Daughter assists "at times" with ADLs.  Does not drive, uses transportation services for medical appointments.  Equipment used at home: rollator, bedside commode, shower chair.  DME owned (not currently used):  rollator .  Upon discharge, patient will have assistance from family and friend.    Objective:     Communicated with nurse Paredes prior to session.  Patient found supine with bed alarm, telemetry, couch catheter, peripheral IV  upon PT entry to room.    General Precautions: Standard, fall  Orthopedic Precautions:LLE weight bearing as tolerated   Braces: N/A  Respiratory Status: Room air    Exams:  Cognitive Exam:  Patient is oriented to Person, Place, Time, Situation, and follows commands  Gross Motor Coordination:  Limited throughout L LE due to surgery and pain  Postural Exam:  Patient presented with the following abnormalities:    -       Rounded shoulders  -       Forward head  -       Kyphosis  Sensation:    -       Intact  light/touch grossly  Skin Integrity/Edema:      -       Skin integrity: Wound L lateral hip dressed  -       Edema: Moderate L hip and thigh area  RLE ROM: WFL  RLE Strength: 4/5 grossly, muscle wasting at calf  LLE ROM: Deficits: Decreased 50% at hip due to edema and pain  LLE Strength: Deficits: Moves L LE against gravity at all joints, does not tolerate MMT    Functional Mobility:  Bed Mobility:     Scooting: moderate assistance  Supine to Sit: moderate assistance  Transfers:     Sit to Stand:  minimum assistance and of 2 persons with rolling walker x 3 trials  Gait: 1.  Standing marching in place with mod assist, Max Vcs to apply more WB on UE   2. "  Gait x 5-6 steps with RW and Min assist towards Bedside chair, VC's for safety, shuffling steps, short step length  Balance: Seated EOB:  SBA   Standing: requires RW, slightly unsteady, Min assist x 2 for dynamic      AM-PAC 6 CLICK MOBILITY  Total Score:13       Treatment & Education:  Patient educated on role of therapy.  Patient educated on WB status and use of RW with standing and gait.  Patient educated to perform Ankle pumps while up in chair.  OT retrieves Ice pack for Left hip.  Patient instructed to remove ice pack after 15 minutes.      Patient left up in chair with all lines intact, call button in reach, chair alarm on, and nurse notified.    GOALS:   Multidisciplinary Problems       Physical Therapy Goals          Problem: Physical Therapy    Goal Priority Disciplines Outcome Goal Variances Interventions   Physical Therapy Goal     PT, PT/OT Ongoing, Progressing     Description: Goals to be met by: 2024     Patient will increase functional independence with mobility by performin. Supine to sit with Modified [place]  2. Sit to supine with Modified [place]  3. Sit to stand transfer with Supervision  4. Bed to chair transfer with Supervision using Rolling Walker  5. Gait  x 75 feet with Stand-by Assistance using Rolling Walker.   6. Ascend/descend 3 stair with bilateral Handrails Contact Guard Assistance.                           History:     Past Medical History:   Diagnosis Date    Anxiety     Bladder incontinence     Diabetes mellitus, type 2     History of CVA (cerebrovascular accident) 2015    Hypertension     Insomnia     Osteoarthritis        Past Surgical History:   Procedure Laterality Date    PINNING OF HIP Left 3/26/2024    Procedure: PINNING, HIP;  Surgeon: Yung Bloom MD;  Location: [facility];  Service: Orthopedics;  Laterality: Left;  hana table, big C       Time Tracking:     PT Received On: 24  PT Start Time: 1020     PT Stop Time: 1055  PT Total Time (min): 35  min     Billable Minutes: Evaluation 10, Gait Training 15, and Therapeutic Activity 10      03/27/2024

## 2024-03-27 NOTE — PLAN OF CARE
Problem: Occupational Therapy  Goal: Occupational Therapy Goal  Description: Goals to be met by: 04/27     Patient will increase functional independence with ADLs by performing:    Grooming while standing at sink with Stand-by Assistance.  Toileting from bedside commode with Contact Guard Assistance for hygiene and clothing management.   Toilet transfer to bedside commode with Contact Guard Assistance.  Increased functional strength to WFL for ADLs.    Outcome: Ongoing, Progressing   Pt found in supine & agreeable to OT/PT co-eval/tx this date.  Pt c/o 5/10 L hip pain & perf the following:  -sup-->EOB w/ Min A x 2; scootinng forward to EOB w/ Mod A  -standing via RW w/ Min A for t/f-->b/s chair w/ Min A  Edu/tx re: general safety techs & HEP. Pt verbalized understanding.  **provided ice pack for L hip  *pt left UIC w/ alarm & nsg made aware.    Pt presents w/ decreased overall endurance/conditioning, balance/mobility & coordination w/ subsequent decline in (I)/safety w/ BADLs, fxnl mobility & fxnl t/f's.  OT 5x/wk to increase phys/fxnl status & maximize potential to achieve established goals for d/c-->mod intensity tx w/ DME deferred.

## 2024-03-27 NOTE — PLAN OF CARE
CM met with pt   POD #1 L KOREY  --   PT/OT notes Pending   CM spoke with pt's daughter  [phone] - Jacqueline.  phone # corrected in Epic   Daughter prefers placement close to [place] as she has limited transportation options   She is ok with placement in the [facility] area.     Lazaro with [facility] is reviewing pt - CM will send therapy notes when they are available.      1429 - therapy notes sent per [facility] to Lazaro for review        03/27/24 1039   Post-Acute Status   Post-Acute Authorization Placement   Post-Acute Placement Status Referrals Sent   Discharge Plan   Discharge Plan A Skilled Nursing Facility

## 2024-03-27 NOTE — CARE UPDATE
[facility]     Department of Hospital Medicine     [address] [place] [address]     [phone] after hours  [phone] fax       NURSING HOME ORDERS    03/27/2024    Admit to Nursing Home:  Regular Bed           Diagnoses:  Active Hospital Problems    Diagnosis  POA    *Syncope and collapse [R55]  Unknown    Fracture of femoral neck, left [S72.002A]  Unknown    Hyperlipemia [E78.5]  Unknown    Osteoarthritis [M19.90]  Yes    History of CVA (cerebrovascular accident) [Z86.73]  Not Applicable     -history of CVA around 2014  -residual deficits include vertigo, improving  -still on ASA/statin      Essential hypertension [I10]  Yes     -above goal today but having some back pain  -currently on amlodipine 10 mg QD  -will need to repeat BP in a couple of weeks  -consider adding low dose ACEI/ARB if microalbumin increases  -continue lifestyle modification with low sodium diet and exercise   -discussed hypertension disease course and importance of treating high blood pressure  -patient understood and advised of risk of untreated blood pressure.  ER precautions were given   for symptoms of hypertensive urgency and emergency.           Resolved Hospital Problems   No resolved problems to display.       Patient is homebound due to:  Syncope and collapse    Allergies:Review of patient's allergies indicates:  No Known Allergies    Vitals:       Once weekly         Diet: Cardiac, Diabetic    Supplement:  1 can every three times a day with meals                         Type:  House          Acitivities:      - Up in a chair each morning as tolerated   - Ambulate with assistance to bathroom   - Scheduled walks once each shift (every 8 hours)   - May ambulate independently when cleared by PT   - May use walker, cane, or self-propelled wheelchair   - Weight bearing: w/PT    LABS:  Per facility protocol      Nursing Precautions:           - Fall precautions per nursing home  protocol      CONSULTS:      Physical Therapy to evaluate and treat     Occupational Therapy to evaluate and treat     Nutrition to evaluate and recommend diet        MISCELLANEOUS CARE:               Colostomy Care:  Empty bag every shift and prn                                             Change and clean site every 48 hours     PEG Care:  Clean site every 24 hours     Perez Care: Empty Perez bag every shift.  Change Perez every month     Routine Skin for Bedridden Patients:  Apply moisture barrier cream to all    skin folds and wet areas in perineal area daily and after baths and                           all bowel movements.    (For Wound Care Orders, insert SmartPhrase HOSWOUNDHOMEHEALTHPART here then delete this reminder)      (For CHF Care Orders, insert SmartPhrase HOSCHFHOMEHEALTHPART here then delete this reminder)       For [facility] Patients Only:    Alert [facility]  prior to discharge.  Phone # [phone]            # [phone] after hours    Fax discharge orders to [facility]: Fax # [phone]   Fax discharge orders to [facility]: Fax # [phone]                    DIABETES CARE:      Fingerstick blood sugar a.m and p.m.         Report CBG < 60 or > 400 to physician.                                          Insulin Sliding Scale          Glucose  Novolog Insulin Subcutaneous        0 - 60   Orange juice or glucose tablet, hold insulin      No insulin   201-250  2 units   251-300  4 units   301-350  6 units   351-400  8 units   >400   10 units then call physician      Medications: Discontinue all previous medication orders, if any. See new list below.       Medication List        ASK your doctor about these medications      amLODIPine 10 MG tablet  Commonly known as: NORVASC  Take 1 tablet (10 mg total) by mouth once daily.     BLOOD GLUCOSE TEST Strp  Generic drug: blood sugar diagnostic  Use 1-2 x a day to check glucoses before meals.     blood-glucose meter kit  Use as instructed    "  ergocalciferol 50,000 unit Cap  Commonly known as: ERGOCALCIFEROL  Take 50,000 Units by mouth every 7 days.     JANUVIA 100 MG Tab  Generic drug: SITagliptin phosphate  Take 100 mg by mouth once daily.     lancets Misc  1 Units by Misc.(Non-Drug; Combo Route) route daily as needed.     losartan-hydrochlorothiazide 50-12.5 mg 50-12.5 mg per tablet  Commonly known as: HYZAAR  Take 1 tablet by mouth once daily.     meclizine 25 mg tablet  Commonly known as: ANTIVERT  Take 1 tablet (25 mg total) by mouth every 6 (six) hours as needed for Dizziness.     pen needle, diabetic 31 gauge x 1/4" Ndle  Use to check insulin twice daily     solifenacin 10 MG tablet  Commonly known as: VESICARE  Take 1 tablet (10 mg total) by mouth once daily.                    _________________________________  Rome Gomez MD  03/27/2024    "

## 2024-03-27 NOTE — ANESTHESIA POSTPROCEDURE EVALUATION
Anesthesia Post Evaluation    Patient: Elda Yoder    Procedure(s) Performed: Procedure(s) (LRB):  PINNING, HIP (Left)    Final Anesthesia Type: general      Patient location during evaluation: PACU  Patient participation: Yes- Able to Participate  Level of consciousness: awake and alert  Post-procedure vital signs: reviewed and stable  Pain management: adequate  Airway patency: patent  GRETCHEN mitigation strategies: Multimodal analgesia  PONV status at discharge: No PONV  Anesthetic complications: no      Cardiovascular status: hemodynamically stable  Respiratory status: spontaneous ventilation and room air  Hydration status: euvolemic  Follow-up not needed.              Vitals Value Taken Time   /70 03/26/24 2050   Temp 36.4 °C (97.6 °F) 03/26/24 2050   Pulse 84 03/26/24 2050   Resp 16 03/26/24 2050   SpO2 97 % 03/26/24 2050         Event Time   Out of Recovery 03/26/2024 20:50:46         Pain/Alirio Score: Pain Rating Prior to Med Admin: 5 (3/26/2024  8:24 PM)  Pain Rating Post Med Admin: 4 (3/25/2024  4:35 PM)

## 2024-03-27 NOTE — PROGRESS NOTES
FELPIA rec'd a call from Lazaro at [facility] -- Ms. Yoder has been accepted at [facility] in [place]. The admissions person is reaching out to pt's daughter. They can likely take her tomorrow.  MD staff informed per Secure Chat.

## 2024-03-27 NOTE — PROGRESS NOTES
[facility]  Progress Note    Patient Name: Elda Yoder  MRN: [id number]  Patient Class: IP- Inpatient   Admission Date: 3/25/2024  Length of Stay: 2 days  Attending Physician: Ian Sharif MD  Primary Care Provider: Good, Provider        Subjective:     Principal Problem:Syncope and collapse        HPI:  Patient is a 73 y.o.-year-old female w/ PMHx CVA in 2014, loop recorder since 2014 for syncope since 2014, DM (5.8 A1c 3/25/24), HTN, osteoarthritis who presents to the ED with reports of a fall five days ago. She states five days prior to admission she felt dizzy in her kitchen when she fell and hit her head and her left hip. She denies loss of consciousness, she states that it was a light bump which left no lacerations or contusions, denies vision changes, nausea, vomiting, chest pain or palpitations, or tongue biting when she fell. She has not been having nausea, vomiting, or vision changes for the past five days since fall. She endorses falling on her left hip as well which was initially painful but she has been able to ambulate short distances and is not tender to palpation. She denies any fevers, chills, shortness of breath since the fall. Of note, patient states that she was originally on amlodipine 10 for years and was recently started on hyzaar approximately 2-3 weeks ago and she has been compliant with all her medications. She does not recall what her blood pressure is on her home medications, but she states it is in the 170s when she is off of them.     In the ED, initial vitals Temp 98.5F, /84, HR 87, RR 17, SpO2 100% on room air. CBC was WNL, CMP was WNL. And Estimated Creatinine Clearance: 29.6 mL/min (based on SCr of 1.36 mg/dL).  CXR showed cardiac monitoring device, no cardiomegaly and no effusions, hip x-ray showed left subcapital femoral neck fracture, CT of hip confirmednondisplaced subcapital left femoral neck fracture, EKG showed normal sinus rhythm  with prolonged QTc of 506.   [facility] admitted patient for evaluation for syncope and medical optimization for procedure for left femoral neck fracture.     Overview/Hospital Course:  No notes on file    Interval History:   NAEON. VSSAF RA. Surg well tolerated pain controlled on Norco5 prns. Pt to work w/PT, Perez out after ambulation. Started ASA 81mg bid, per Ortho.  Ucx shows GNR, f/u cx. Rocephin for now.    Review of Systems   Constitutional:  Negative for fever.   Respiratory:  Negative for cough and shortness of breath.    Cardiovascular:  Negative for chest pain and palpitations.   Gastrointestinal:  Negative for abdominal pain, diarrhea, nausea and vomiting.   Genitourinary:  Negative for dysuria and frequency.   Musculoskeletal:  Positive for arthralgias and myalgias.   Neurological:  Negative for dizziness and headaches.     Objective:     Vital Signs (Most Recent):  Temp: 98.6 °F (37 °C) (03/27/24 1123)  Pulse: 87 (03/27/24 1212)  Resp: 16 (03/27/24 1123)  BP: 115/60 (03/27/24 1123)  SpO2: 95 % (03/27/24 1123) Vital Signs (24h Range):  Temp:  [97.6 °F (36.4 °C)-98.7 °F (37.1 °C)] 98.6 °F (37 °C)  Pulse:  [70-87] 87  Resp:  [11-18] 16  SpO2:  [93 %-100 %] 95 %  BP: (115-175)/(60-92) 115/60     Weight: 55 kg (121 lb 4.1 oz)  Body mass index is 23.68 kg/m².    Intake/Output Summary (Last 24 hours) at 3/27/2024 1251  Last data filed at 3/27/2024 0617  Gross per 24 hour   Intake 623.21 ml   Output 360 ml   Net 263.21 ml         Physical Exam  Constitutional:       Appearance: Normal appearance.   HENT:      Head: Normocephalic and atraumatic.   Cardiovascular:      Rate and Rhythm: Normal rate and regular rhythm.      Pulses: Normal pulses.      Heart sounds: Normal heart sounds. No murmur heard.  Pulmonary:      Effort: Pulmonary effort is normal. No respiratory distress.      Breath sounds: Normal breath sounds. No stridor. No wheezing, rhonchi or rales.   Abdominal:      General: Abdomen is  flat.      Palpations: Abdomen is soft.      Tenderness: There is no abdominal tenderness.   Musculoskeletal:      Right lower leg: No edema.      Left lower leg: No edema.      Comments: Tenderness to L hip  Bandaged  Warm NV intact    Neurological:      General: No focal deficit present.      Mental Status: She is alert and oriented to person, place, and time.   Psychiatric:         Mood and Affect: Mood normal.         Behavior: Behavior normal.         Thought Content: Thought content normal.         Judgment: Judgment normal.             Significant Labs: All pertinent labs within the past 24 hours have been reviewed.  CBC:   Recent Labs   Lab 03/25/24  1831 03/26/24  0540 03/27/24  0333   WBC 7.75 6.49 8.75   HGB 13.0 12.2 11.7*   HCT 40.7 37.8 36.5*    342 321     CMP:   Recent Labs   Lab 03/25/24  1831 03/26/24  0540 03/27/24  0333    143 141   K 3.7 4.1 4.6    108 108   CO2 27 24 23    99 200*   BUN 32* 33* 34*   CREATININE 1.36 1.5* 1.7*   CALCIUM 9.9 9.6 9.2   PROT 8.5* 7.0 6.9   ALBUMIN 4.5 3.7 3.5   BILITOT 0.4 0.3 0.3   ALKPHOS 70 60 68   AST 17 10 13   ALT 9* 7* <5*   ANIONGAP 11 11 10       Significant Imaging: I have reviewed all pertinent imaging results/findings within the past 24 hours.    Assessment/Plan:      * Syncope and collapse  Patient with history of episodes of syncope since 2014, patient has a loop recorder visualized on CXR and confirmed on chart review. She had an episode of dizziness followed by fall with no LOC or seizures five days prior to admission. She denied any nausea/vomiting, chest pain, SOB associated with her dizziness.   Troponin negative, lactic acid negative, EKG on admit showed normal sinus rhythm with prolonged QTc of 506. On day of admit she denied any headache, dizziness, vision changes.   Echo reviewed, patient went to surgery w/Cards optimization... no need for further Cards work up    Plan:          Hyperlipemia  ASCVD (Atherosclerotic  Cardiovascular Disease) 2013 Risk Calculator from AHA/ACC from [employer]  on 3/26/2024  ** All calculations should be rechecked by clinician prior to use **    RESULT SUMMARY:     Moderate- to high-intensity statin recommended because 10-year risk >7.5%    To view statin dosages by intensity, see Evidence section.    13.7%    Risk of cardiovascular event (coronary or stroke death or non-fatal MI or stroke) in next 10 years.    7.7%    10-year cardiovascular risk if risk factors were optimal.      INPUTS:  Age --> 73 years  Diabetes --> 0 = No  Sex --> 0 = Female  Smoker --> 1 = Yes  Total cholesterol --> 215 mg/dL  HDL cholesterol --> 51 mg/dL  Systolic blood pressure --> 114 mm Hg  Treatment for hypertension --> 0 = No  Race --> 1 = White    Plan:  atorvastatin 20    Fracture of femoral neck, left  Fall 5 days prior to admission, patient able to ambulate but shorter distances than before fall. 5 day history of left hip pain to movement, no acute contusions or lacerations visualized. Hip xray showed left subcapital femoral neck fracture confirmed on CT which showed nondisplaced left femoral neck fracture as well.    Plan:  Pre-operative evaluation with risk assessment              -           Scheduled for left hip pinning on 3/26 by Dr. Bloom  -           DASI score: 30.2 w/ Functional Capacity in METS: 6.45 (>4) with a revised cardiac index   risk of 10.1%.    - Not on antiplatelets/anticoagulation.   - no h/o blood dyscrasias or previous reaction to anesthesia   - is a smoker. 1 ppd.  - has prior h/o HLD/CAD w/ CVA. ASCVD: 13.7%. Continue medical management w/ atorvastatin 20  - has prior h/o DM. Last HgbA1C:~5.8  - has no prior h/o thyroid disease. TSH: pending  - has prior h/o HTN. BP: 124/59.   - has no prior h/o COPD/Asthma/GRETCHEN/OHS, suspected COPD on prior notes from previous providors. Does not use CPAP.   - has no prior h/o HF. Echo: pending  - BMI: Body mass index is 23.68 kg/m².              -            The patient is not medically optimized to proceed with (per ACC/AHA reshma-operative guidelines) a moderate risk surgery.      PLAN:  DVT PPX on POD1 per ortho            Osteoarthritis        History of CVA (cerebrovascular accident)  Patient with history of CVA in 2014, long history of vertigo after CVA, could be residual effect after CVA.     Plan:  Atorvastatin 20    Essential hypertension  Chronic, controlled. Latest blood pressure and vitals reviewed-     Temp:  [97.6 °F (36.4 °C)-98.7 °F (37.1 °C)]   Pulse:  [70-87]   Resp:  [11-18]   BP: (115-175)/(60-92)   SpO2:  [93 %-100 %] .   Home meds for hypertension were reviewed and noted below.   Hypertension Medications               amLODIPine (NORVASC) 10 MG tablet Take 1 tablet (10 mg total) by mouth once daily.    losartan-hydrochlorothiazide 50-12.5 mg (HYZAAR) 50-12.5 mg per tablet Take 1 tablet by mouth once daily.          Will utilize p.r.n. blood pressure medication only if patient's blood pressure greater than 180/110 and she develops symptoms such as worsening chest pain or shortness of breath.  BP well controlled today    Plan:  Continue home amlodipine as it is a historic medication  Hold hyzaar as it is a more recent medication, consider adding if morning BP is elevated      VTE Risk Mitigation (From admission, onward)           Ordered     Reason for No Pharmacological VTE Prophylaxis  Once        Question:  Reasons:  Answer:  Physician Provided (leave comment)  Comment:  possible surgery    03/25/24 2129     IP VTE HIGH RISK PATIENT  Once         03/25/24 2129     Place sequential compression device  Until discontinued         03/25/24 2129                    Discharge Planning   CESILIA:      Code Status: Full Code   Is the patient medically ready for discharge?:     Reason for patient still in hospital (select all that apply): Patient trending condition  Discharge Plan A: Skilled Nursing Facility                  Rome Gomez  MD  [facility]   [facility]

## 2024-03-27 NOTE — PLAN OF CARE
[facility] Plan of Care     Patient s/p L hip CRPP.     2g ancef q8 for 24 hours ordered   WBAT   PT/OT ordered   Post op XR L hip ordered  MM pain control   Ok for diet   Ok for DVT ppx tomorrow     Josselyn Lawrence MD  [facility]

## 2024-03-27 NOTE — PLAN OF CARE
Patient seen for physical therapy evaluation on this date, co-evaluation with occupational therapy.  Patient will benefit from inpatient physical therapy to address functional limitations.  Anticipate patient will benefit from moderate intensity therapy to address limitations.  Patient will also benefit from a RW and Tub bench for home use.      Problem: Physical Therapy  Goal: Physical Therapy Goal  Description: Goals to be met by: 2024     Patient will increase functional independence with mobility by performin. Supine to sit with Modified [place]  2. Sit to supine with Modified [place]  3. Sit to stand transfer with Supervision  4. Bed to chair transfer with Supervision using Rolling Walker  5. Gait  x 75 feet with Stand-by Assistance using Rolling Walker.   6. Ascend/descend 3 stair with bilateral Handrails Contact Guard Assistance.      Outcome: Ongoing, Progressing

## 2024-03-27 NOTE — PROGRESS NOTES
[facility] Progress Note     Chief Complaint:  L nondisplaced hip fracture   L hip CRPP (3/26/24)     HPI:  Pleasant 73F who sustained a GLF 5 days prior. Medical hx significant for DM (last A1c in system 6.7), HTN. CVA (no motor deficits-vertigo). She had a mechanical slip and fall in her kitchen, was able to ambulate afterwards. Presented today to outside ED for increased pain. CT w/ L subcapital femoral neck fx.      S: NAEON overnight. Pain controlled      O:   LLE:  L hip dressing c/d/i  NonTTP about thigh, knee, leg, ankle, foot  ROM normal hip, knee, ankle  TA/gastroc/EHL/FHL intact with 5/5 strength  SILT grossly  2+ DP pulse     A/P:   -WBAT - PT/OT   -MM pain control   -May need placement  -Perez out after ambulating   -Recommend 6 weeks ASA 81mg BID on discharge     Josselyn Lawrence MD  [facility]

## 2024-03-27 NOTE — PT/OT/SLP EVAL
Occupational Therapy   Evaluation/tx    Name: Elda Yoder  MRN: [id number]  Admitting Diagnosis: Syncope and collapse  Recent Surgery: Procedure(s) (LRB):  PINNING, HIP (Left) 1 Day Post-Op    Recommendations:     Discharge Recommendations: Moderate Intensity Therapy  Discharge Equipment Recommendations:  bath bench, walker, rolling  Barriers to discharge:  Other (Comment) (increased burden of care)    Assessment:   Pt presents w/ decreased overall endurance/conditioning, balance/mobility & coordination w/ subsequent decline in (I)/safety w/ BADLs, fxnl mobility & fxnl t/f's.  OT 5x/wk to increase phys/fxnl status & maximize potential to achieve established goals for d/c-->mod intensity tx w/ DME deferred.    Elda Yoder is a 73 y.o. female with a medical diagnosis of Syncope and collapse.  She presents with performance deficits affecting function: weakness, impaired endurance, impaired self care skills, impaired functional mobility, gait instability, impaired balance, decreased coordination, decreased lower extremity function, pain, decreased ROM, impaired skin, edema.      Rehab Prognosis: Good; patient would benefit from acute skilled OT services to address these deficits and reach maximum level of function.       Plan:     Patient to be seen 5 x/week to address the above listed problems via self-care/home management, therapeutic activities, therapeutic exercises  Plan of Care Expires: 04/27/24  Plan of Care Reviewed with: patient    Subjective     Chief Complaint: fall  Patient/Family Comments/goals: return to PLOF    Occupational Profile:  Living Environment: w/ dgtr/friend in H w/ 3STE & BHR; t/s w/ shch  Previous level of function: MI via rollator except PRN Asst w/ LBD & bathing  Roles and Routines: light IADLs  Equipment Used at Home: shower chair, bedside commode, rollator  Assistance upon Discharge: may require more than currently available    Pain/Comfort:  Pain Rating 1: 5/10  Location - Side 1:  Left  Location - Orientation 1: generalized  Location 1: hip  Pain Addressed 1: Pre-medicate for activity, Reposition, Distraction, Nurse notified, Other (see comments) (provided ice pack)  Pain Rating Post-Intervention 1: 4/10    Patients cultural, spiritual, Episcopal conflicts given the current situation:      Objective:     Communicated with: nsg prior to session.  Patient found HOB elevated with bed alarm, couch catheter, peripheral IV upon OT entry to room.    General Precautions: Standard, fall  Orthopedic Precautions: LLE weight bearing as tolerated  Braces: N/A  Respiratory Status: Room air    Occupational Performance:    Bed Mobility:    Patient completed Scooting/Bridging with moderate assistance  Patient completed Supine to Sit with minimum assistance, 2 persons, and with side rail    Functional Mobility/Transfers:  Patient completed Sit <> Stand Transfer with minimum assistance  with  rolling walker   Patient completed Bed <> Chair Transfer using Step Transfer technique with minimum assistance with rolling walker  Functional Mobility:     Activities of Daily Living:  Lower Body Dressing: maximal assistance don socks    Cognitive/Visual Perceptual:  AO4    No signif deficits noted    Physical Exam:  BUEs WFL at 4/5 prox; 4+/5 distally    Sit balance: F- to F  Stand balance: P+ to F-    AMPAC 6 Click ADL:  AMPAC Total Score: 17    Treatment & Education:  Pt found in supine & agreeable to OT/PT co-eval/tx this date.  Pt c/o 5/10 L hip pain & perf the following:  -sup-->EOB w/ Min A x 2; scootinng forward to EOB w/ Mod A  -standing via RW w/ Min A for t/f-->b/s chair w/ Min A  Edu/tx re: general safety techs & HEP. Pt verbalized understanding.  **provided ice pack for L hip  *pt left UIC w/ alarm & nsg made aware.      Patient left up in chair with all lines intact, call button in reach, chair alarm on, and nsg notified    GOALS:   Multidisciplinary Problems       Occupational Therapy Goals           Problem: Occupational Therapy    Goal Priority Disciplines Outcome Interventions   Occupational Therapy Goal     OT, PT/OT Ongoing, Progressing    Description: Goals to be met by: 04/27     Patient will increase functional independence with ADLs by performing:    Grooming while standing at sink with Stand-by Assistance.  Toileting from bedside commode with Contact Guard Assistance for hygiene and clothing management.   Toilet transfer to bedside commode with Contact Guard Assistance.  Increased functional strength to WFL for ADLs.                         History:     Past Medical History:   Diagnosis Date    Anxiety     Bladder incontinence     Diabetes mellitus, type 2     History of CVA (cerebrovascular accident) 2015    Hypertension     Insomnia     Osteoarthritis          Past Surgical History:   Procedure Laterality Date    PINNING OF HIP Left 3/26/2024    Procedure: PINNING, HIP;  Surgeon: Yung Bloom MD;  Location: [facility];  Service: Orthopedics;  Laterality: Left;  scarletta table, rocky C       Time Tracking:     OT Date of Treatment: 03/27/24  OT Start Time: 1032  OT Stop Time: 1055  OT Total Time (min): 23 min    Billable Minutes:Evaluation 10  Therapeutic Activity 13  Total Time 23--co-jeremías w/ PT    3/27/2024

## 2024-03-27 NOTE — PLAN OF CARE
Pt AAOx4. PRN Percocet given for c/o pain. No c/o N/V. Medications administered per MAR. On RA. Cardiac monitoring maintained. Blood glucose monitored, sliding scale insulin given. Dressing to L hip CDI. Perez to gravity, draining cloudy yellow urine. Bed alarm set, side rails raised, and call light in reach.

## 2024-03-27 NOTE — ASSESSMENT & PLAN NOTE
Fall 5 days prior to admission, patient able to ambulate but shorter distances than before fall. 5 day history of left hip pain to movement, no acute contusions or lacerations visualized. Hip xray showed left subcapital femoral neck fracture confirmed on CT which showed nondisplaced left femoral neck fracture as well.    Plan:  Pre-operative evaluation with risk assessment              -           Scheduled for left hip pinning on 3/26 by Dr. Bloom  -           DASI score: 30.2 w/ Functional Capacity in METS: 6.45 (>4) with a revised cardiac index   risk of 10.1%.    - Not on antiplatelets/anticoagulation.   - no h/o blood dyscrasias or previous reaction to anesthesia   - is a smoker. 1 ppd.  - has prior h/o HLD/CAD w/ CVA. ASCVD: 13.7%. Continue medical management w/ atorvastatin 20  - has prior h/o DM. Last HgbA1C:~5.8  - has no prior h/o thyroid disease. TSH: pending  - has prior h/o HTN. BP: 124/59.   - has no prior h/o COPD/Asthma/GRETCHEN/OHS, suspected COPD on prior notes from previous providors. Does not use CPAP.   - has no prior h/o HF. Echo: pending  - BMI: Body mass index is 23.68 kg/m².              -           The patient is not medically optimized to proceed with (per ACC/AHA reshma-operative guidelines) a moderate risk surgery.      PLAN:  DVT PPX on POD1 per ortho

## 2024-03-28 ENCOUNTER — CLINICAL SUPPORT (OUTPATIENT)
Dept: SMOKING CESSATION | Facility: CLINIC | Age: 74
End: 2024-03-28
Payer: COMMERCIAL

## 2024-03-28 VITALS
HEART RATE: 77 BPM | BODY MASS INDEX: 23.81 KG/M2 | DIASTOLIC BLOOD PRESSURE: 59 MMHG | OXYGEN SATURATION: 98 % | SYSTOLIC BLOOD PRESSURE: 113 MMHG | RESPIRATION RATE: 16 BRPM | WEIGHT: 121.25 LBS | TEMPERATURE: 98 F | HEIGHT: 60 IN

## 2024-03-28 DIAGNOSIS — F17.210 CIGARETTE SMOKER: Primary | ICD-10-CM

## 2024-03-28 LAB
ALBUMIN SERPL BCP-MCNC: 3.3 G/DL (ref 3.5–5.2)
ALP SERPL-CCNC: 66 U/L (ref 55–135)
ALT SERPL W/O P-5'-P-CCNC: <5 U/L (ref 10–44)
ANION GAP SERPL CALC-SCNC: 10 MMOL/L (ref 8–16)
AST SERPL-CCNC: 11 U/L (ref 10–40)
BACTERIA UR CULT: ABNORMAL
BASOPHILS # BLD AUTO: 0.07 K/UL (ref 0–0.2)
BASOPHILS NFR BLD: 0.8 % (ref 0–1.9)
BILIRUB SERPL-MCNC: 0.3 MG/DL (ref 0.1–1)
BUN SERPL-MCNC: 38 MG/DL (ref 8–23)
CALCIUM SERPL-MCNC: 9.1 MG/DL (ref 8.7–10.5)
CHLORIDE SERPL-SCNC: 108 MMOL/L (ref 95–110)
CO2 SERPL-SCNC: 24 MMOL/L (ref 23–29)
CREAT SERPL-MCNC: 1.7 MG/DL (ref 0.5–1.4)
DIFFERENTIAL METHOD BLD: ABNORMAL
EOSINOPHIL # BLD AUTO: 0.1 K/UL (ref 0–0.5)
EOSINOPHIL NFR BLD: 1.3 % (ref 0–8)
ERYTHROCYTE [DISTWIDTH] IN BLOOD BY AUTOMATED COUNT: 13.4 % (ref 11.5–14.5)
EST. GFR  (NO RACE VARIABLE): 32 ML/MIN/1.73 M^2
GLUCOSE SERPL-MCNC: 110 MG/DL (ref 70–110)
HCT VFR BLD AUTO: 33.2 % (ref 37–48.5)
HGB BLD-MCNC: 10.6 G/DL (ref 12–16)
IMM GRANULOCYTES # BLD AUTO: 0.01 K/UL (ref 0–0.04)
IMM GRANULOCYTES NFR BLD AUTO: 0.1 % (ref 0–0.5)
LYMPHOCYTES # BLD AUTO: 3.6 K/UL (ref 1–4.8)
LYMPHOCYTES NFR BLD: 40.5 % (ref 18–48)
MAGNESIUM SERPL-MCNC: 2.1 MG/DL (ref 1.6–2.6)
MCH RBC QN AUTO: 27.5 PG (ref 27–31)
MCHC RBC AUTO-ENTMCNC: 31.9 G/DL (ref 32–36)
MCV RBC AUTO: 86 FL (ref 82–98)
MONOCYTES # BLD AUTO: 0.8 K/UL (ref 0.3–1)
MONOCYTES NFR BLD: 9 % (ref 4–15)
NEUTROPHILS # BLD AUTO: 4.3 K/UL (ref 1.8–7.7)
NEUTROPHILS NFR BLD: 48.3 % (ref 38–73)
NRBC BLD-RTO: 0 /100 WBC
OHS QRS DURATION: 84 MS
OHS QTC CALCULATION: 509 MS
PHOSPHATE SERPL-MCNC: 3.5 MG/DL (ref 2.7–4.5)
PLATELET # BLD AUTO: 301 K/UL (ref 150–450)
PMV BLD AUTO: 10.1 FL (ref 9.2–12.9)
POCT GLUCOSE: 100 MG/DL (ref 70–110)
POCT GLUCOSE: 179 MG/DL (ref 70–110)
POTASSIUM SERPL-SCNC: 4.3 MMOL/L (ref 3.5–5.1)
PROT SERPL-MCNC: 6.6 G/DL (ref 6–8.4)
RBC # BLD AUTO: 3.86 M/UL (ref 4–5.4)
SODIUM SERPL-SCNC: 142 MMOL/L (ref 136–145)
WBC # BLD AUTO: 8.89 K/UL (ref 3.9–12.7)

## 2024-03-28 PROCEDURE — 83735 ASSAY OF MAGNESIUM: CPT | Performed by: STUDENT IN AN ORGANIZED HEALTH CARE EDUCATION/TRAINING PROGRAM

## 2024-03-28 PROCEDURE — 63600175 PHARM REV CODE 636 W HCPCS

## 2024-03-28 PROCEDURE — 97110 THERAPEUTIC EXERCISES: CPT | Mod: CQ

## 2024-03-28 PROCEDURE — 97530 THERAPEUTIC ACTIVITIES: CPT

## 2024-03-28 PROCEDURE — 99407 BEHAV CHNG SMOKING > 10 MIN: CPT | Mod: S$GLB,,,

## 2024-03-28 PROCEDURE — 25000003 PHARM REV CODE 250

## 2024-03-28 PROCEDURE — 36415 COLL VENOUS BLD VENIPUNCTURE: CPT | Performed by: STUDENT IN AN ORGANIZED HEALTH CARE EDUCATION/TRAINING PROGRAM

## 2024-03-28 PROCEDURE — 80053 COMPREHEN METABOLIC PANEL: CPT | Performed by: STUDENT IN AN ORGANIZED HEALTH CARE EDUCATION/TRAINING PROGRAM

## 2024-03-28 PROCEDURE — 25000003 PHARM REV CODE 250: Performed by: HOSPITALIST

## 2024-03-28 PROCEDURE — 97530 THERAPEUTIC ACTIVITIES: CPT | Mod: CQ

## 2024-03-28 PROCEDURE — 85025 COMPLETE CBC W/AUTO DIFF WBC: CPT | Performed by: STUDENT IN AN ORGANIZED HEALTH CARE EDUCATION/TRAINING PROGRAM

## 2024-03-28 PROCEDURE — 84100 ASSAY OF PHOSPHORUS: CPT | Performed by: STUDENT IN AN ORGANIZED HEALTH CARE EDUCATION/TRAINING PROGRAM

## 2024-03-28 PROCEDURE — S4991 NICOTINE PATCH NONLEGEND: HCPCS | Performed by: HOSPITALIST

## 2024-03-28 PROCEDURE — 97116 GAIT TRAINING THERAPY: CPT | Mod: CQ

## 2024-03-28 PROCEDURE — 25000003 PHARM REV CODE 250: Performed by: STUDENT IN AN ORGANIZED HEALTH CARE EDUCATION/TRAINING PROGRAM

## 2024-03-28 PROCEDURE — 97535 SELF CARE MNGMENT TRAINING: CPT

## 2024-03-28 RX ORDER — OXYCODONE AND ACETAMINOPHEN 5; 325 MG/1; MG/1
1 TABLET ORAL EVERY 6 HOURS PRN
Qty: 12 TABLET | Refills: 0 | Status: CANCELLED | OUTPATIENT
Start: 2024-03-28 | End: 2024-03-31

## 2024-03-28 RX ORDER — ASPIRIN 81 MG/1
81 TABLET ORAL 2 TIMES DAILY
Qty: 84 TABLET | Refills: 0 | Status: SHIPPED | OUTPATIENT
Start: 2024-03-28 | End: 2024-05-09

## 2024-03-28 RX ORDER — DEXTROMETHORPHAN HYDROBROMIDE, GUAIFENESIN 5; 100 MG/5ML; MG/5ML
650 LIQUID ORAL EVERY 8 HOURS
Qty: 42 TABLET | Refills: 0 | Status: SHIPPED | OUTPATIENT
Start: 2024-03-28 | End: 2024-04-11

## 2024-03-28 RX ORDER — IBUPROFEN 200 MG
1 TABLET ORAL DAILY
Status: DISCONTINUED | OUTPATIENT
Start: 2024-03-28 | End: 2024-03-28 | Stop reason: HOSPADM

## 2024-03-28 RX ORDER — OXYCODONE HYDROCHLORIDE 10 MG/1
10 TABLET ORAL EVERY 6 HOURS PRN
Refills: 0
Start: 2024-03-28 | End: 2024-03-31

## 2024-03-28 RX ADMIN — MUPIROCIN: 20 OINTMENT TOPICAL at 09:03

## 2024-03-28 RX ADMIN — ATORVASTATIN CALCIUM 20 MG: 20 TABLET, FILM COATED ORAL at 09:03

## 2024-03-28 RX ADMIN — OXYCODONE HYDROCHLORIDE AND ACETAMINOPHEN 1 TABLET: 5; 325 TABLET ORAL at 04:03

## 2024-03-28 RX ADMIN — AMLODIPINE BESYLATE 10 MG: 5 TABLET ORAL at 09:03

## 2024-03-28 RX ADMIN — POLYETHYLENE GLYCOL 3350 17 G: 17 POWDER, FOR SOLUTION ORAL at 09:03

## 2024-03-28 RX ADMIN — CEFTRIAXONE SODIUM 1 G: 1 INJECTION, POWDER, FOR SOLUTION INTRAMUSCULAR; INTRAVENOUS at 11:03

## 2024-03-28 RX ADMIN — ASPIRIN 81 MG: 81 TABLET, COATED ORAL at 09:03

## 2024-03-28 RX ADMIN — OXYCODONE HYDROCHLORIDE AND ACETAMINOPHEN 1 TABLET: 5; 325 TABLET ORAL at 11:03

## 2024-03-28 RX ADMIN — NICOTINE 1 PATCH: 21 PATCH, EXTENDED RELEASE TRANSDERMAL at 11:03

## 2024-03-28 RX ADMIN — ACETAMINOPHEN 650 MG: 325 TABLET ORAL at 09:03

## 2024-03-28 NOTE — PLAN OF CARE
Problem: Physical Therapy  Goal: Physical Therapy Goal  Description: Goals to be met by: 2024     Patient will increase functional independence with mobility by performin. Supine to sit with Modified [place]  2. Sit to supine with Modified [place]  3. Sit to stand transfer with Supervision  4. Bed to chair transfer with Supervision using Rolling Walker  5. Gait  x 75 feet with Stand-by Assistance using Rolling Walker.   6. Ascend/descend 3 stair with bilateral Handrails Contact Guard Assistance.      Outcome: Ongoing, Progressing

## 2024-03-28 NOTE — DISCHARGE SUMMARY
[facility] Medicine  Discharge Summary      Patient Name: Elda Yoder  MRN: [id number]  IGGY: [id number]  Patient Class: IP- Inpatient  Admission Date: 3/25/2024  Hospital Length of Stay: 3 days  Discharge Date and Time:  03/28/2024 11:51 AM  Attending Physician: Ian Sharif MD   Discharging Provider: Rome Gomez MD  Primary Care Provider: Good, Shelbi    Primary Care Team: Networked reference to record PCT     HPI:   Patient is a 73 y.o.-year-old female w/ PMHx CVA in 2014, loop recorder since 2014 for syncope since 2014, DM (5.8 A1c 3/25/24), HTN, osteoarthritis who presents to the ED with reports of a fall five days ago. She states five days prior to admission she felt dizzy in her kitchen when she fell and hit her head and her left hip. She denies loss of consciousness, she states that it was a light bump which left no lacerations or contusions, denies vision changes, nausea, vomiting, chest pain or palpitations, or tongue biting when she fell. She has not been having nausea, vomiting, or vision changes for the past five days since fall. She endorses falling on her left hip as well which was initially painful but she has been able to ambulate short distances and is not tender to palpation. She denies any fevers, chills, shortness of breath since the fall. Of note, patient states that she was originally on amlodipine 10 for years and was recently started on hyzaar approximately 2-3 weeks ago and she has been compliant with all her medications. She does not recall what her blood pressure is on her home medications, but she states it is in the 170s when she is off of them.     In the ED, initial vitals Temp 98.5F, /84, HR 87, RR 17, SpO2 100% on room air. CBC was WNL, CMP was WNL. And Estimated Creatinine Clearance: 29.6 mL/min (based on SCr of 1.36 mg/dL).  CXR showed cardiac monitoring device, no cardiomegaly and no effusions, hip x-ray showed left subcapital  femoral neck fracture, CT of hip confirmednondisplaced subcapital left femoral neck fracture, EKG showed normal sinus rhythm with prolonged QTc of 506.   [facility] admitted patient for evaluation for syncope and medical optimization for procedure for left femoral neck fracture.     Procedure(s) (LRB):  PINNING, HIP (Left)      Hospital Course:   Patient presented to the ED 5 days after a traumatic fall that occurred 5 days before with increase pain in left hip. Orthopedics was consulted, and she was found to have a left subcapital femoral neck fracture. She was admitted to [facility] for evaluation for syncope and medical optimization for procedure for left femoral neck fracture, repaired by Ortho. After surgery, she ambulated w/PT, Perez was removed. She required up to 3 doses of Norco 5. She was switched to ceftriaxone from ancef due to the presence of E. Coli on urine cx. However this was asymptomatic and stopped after two doses. She was discharged to rehab facility on six weeks of 81mg asa bid for DVT prophylaxis. She was discharged in stable condition.     Goals of Care Treatment Preferences:  Code Status: Full Code      Consults:   Consults (From admission, onward)          Status Ordering Provider     Inpatient consult to Cardiology-[facility]  Once        Provider:  María Elena Liu MD    Completed MARIELY HARRIS     Inpatient consult to Registered Dietitian/Nutritionist  Once        Provider:  (Not yet assigned)    Completed ANTHONY MICHAEL            No new Assessment & Plan notes have been filed under this hospital service since the last note was generated.  Service: Hospital Medicine    Final Active Diagnoses:    Diagnosis Date Noted POA    PRINCIPAL PROBLEM:  Syncope and collapse [R55] 03/25/2024 Unknown    Fracture of femoral neck, left [S72.002A] 03/25/2024 Unknown    Hyperlipemia [E78.5] 03/25/2024 Unknown    Osteoarthritis [M19.90]  Yes    History of CVA (cerebrovascular accident)  [Z86.73]  Not Applicable    Essential hypertension [I10]  Yes      Problems Resolved During this Admission:       Discharged Condition: stable    Disposition: Home or Self Care    Follow Up:    Patient Instructions:      Diet diabetic     Diet Cardiac     Notify your health care provider if you experience any of the following:  increased confusion or weakness     Notify your health care provider if you experience any of the following:  persistent dizziness, light-headedness, or visual disturbances     Notify your health care provider if you experience any of the following:  worsening rash     Notify your health care provider if you experience any of the following:  severe persistent headache     Notify your health care provider if you experience any of the following:  difficulty breathing or increased cough     Notify your health care provider if you experience any of the following:  redness, tenderness, or signs of infection (pain, swelling, redness, odor or green/yellow discharge around incision site)     Notify your health care provider if you experience any of the following:  severe uncontrolled pain     Notify your health care provider if you experience any of the following:  persistent nausea and vomiting or diarrhea     Notify your health care provider if you experience any of the following:  temperature >100.4     Activity as tolerated       Significant Diagnostic Studies: Labs: CMP   Recent Labs   Lab 03/27/24  0333 03/28/24  0511    142   K 4.6 4.3    108   CO2 23 24   * 110   BUN 34* 38*   CREATININE 1.7* 1.7*   CALCIUM 9.2 9.1   PROT 6.9 6.6   ALBUMIN 3.5 3.3*   BILITOT 0.3 0.3   ALKPHOS 68 66   AST 13 11   ALT <5* <5*   ANIONGAP 10 10    and CBC   Recent Labs   Lab 03/27/24  0333 03/28/24  0511   WBC 8.75 8.89   HGB 11.7* 10.6*   HCT 36.5* 33.2*    301       Pending Diagnostic Studies:       None           Medications:  Reconciled Home Medications:      Medication List     "    START taking these medications      acetaminophen 650 MG Tbsr  Commonly known as: TYLENOL  Take 1 tablet (650 mg total) by mouth every 8 (eight) hours. for 14 days     aspirin 81 MG EC tablet  Commonly known as: ECOTRIN  Take 1 tablet (81 mg total) by mouth 2 (two) times a day.     oxyCODONE 10 mg Tab immediate release tablet  Commonly known as: ROXICODONE  Take 1 tablet (10 mg total) by mouth every 6 (six) hours as needed for Pain.            CONTINUE taking these medications      BLOOD GLUCOSE TEST Strp  Generic drug: blood sugar diagnostic  Use 1-2 x a day to check glucoses before meals.     blood-glucose meter kit  Use as instructed     ergocalciferol 50,000 unit Cap  Commonly known as: ERGOCALCIFEROL  Take 50,000 Units by mouth every 7 days.     JANUVIA 100 MG Tab  Generic drug: SITagliptin phosphate  Take 100 mg by mouth once daily.     lancets Misc  1 Units by Misc.(Non-Drug; Combo Route) route daily as needed.     losartan-hydrochlorothiazide 50-12.5 mg 50-12.5 mg per tablet  Commonly known as: HYZAAR  Take 1 tablet by mouth once daily.     meclizine 25 mg tablet  Commonly known as: ANTIVERT  Take 1 tablet (25 mg total) by mouth every 6 (six) hours as needed for Dizziness.     pen needle, diabetic 31 gauge x 1/4" Ndle  Use to check insulin twice daily     solifenacin 10 MG tablet  Commonly known as: VESICARE  Take 1 tablet (10 mg total) by mouth once daily.            ASK your doctor about these medications      amLODIPine 10 MG tablet  Commonly known as: NORVASC  Take 1 tablet (10 mg total) by mouth once daily.              Indwelling Lines/Drains at time of discharge:   Lines/Drains/Airways       None                   Time spent on the discharge of patient: 40 minutes         Rome Gomez MD  Department of Hospital Medicine  [place] [facility]  "

## 2024-03-28 NOTE — HOSPITAL COURSE
Patient presented to the ED 5 days after a traumatic fall that occurred 5 days before with increase pain in left hip. Orthopedics was consulted, and she was found to have a left subcapital femoral neck fracture. She was admitted to [facility] for evaluation for syncope and medical optimization for procedure for left femoral neck fracture, repaired by Ortho. After surgery, she ambulated w/PT, Perez was removed. She required up to 3 doses of Norco 5. She was switched to ceftriaxone from ancef due to the presence of E. Coli on urine cx. However this was asymptomatic and stopped after two doses. She was discharged to rehab facility on six weeks of 81mg asa bid for DVT prophylaxis. She was discharged in stable condition.

## 2024-03-28 NOTE — ASSESSMENT & PLAN NOTE
ASCVD (Atherosclerotic Cardiovascular Disease) 2013 Risk Calculator from AHA/ACC from [employer]  on 3/26/2024  ** All calculations should be rechecked by clinician prior to use **    RESULT SUMMARY:     Moderate- to high-intensity statin recommended because 10-year risk >7.5%    To view statin dosages by intensity, see Evidence section.    13.7%    Risk of cardiovascular event (coronary or stroke death or non-fatal MI or stroke) in next 10 years.    7.7%    10-year cardiovascular risk if risk factors were optimal.      INPUTS:  Age --> 73 years  Diabetes --> 0 = No  Sex --> 0 = Female  Smoker --> 1 = Yes  Total cholesterol --> 215 mg/dL  HDL cholesterol --> 51 mg/dL  Systolic blood pressure --> 114 mm Hg  Treatment for hypertension --> 0 = No  Race --> 1 = White    Plan:  atorvastatin 20

## 2024-03-28 NOTE — PT/OT/SLP PROGRESS
Occupational Therapy   Treatment    Name: Elda Yoder  MRN: [id number]  Admitting Diagnosis:  Syncope and collapse  2 Days Post-Op  Pre-op Diagnosis: Closed fracture of neck of left femur, initial encounter [S72.002A] s/p Procedure(s):  PINNING, HIP   The primary encounter diagnosis was Closed fracture of neck of left femur, initial encounter. Diagnoses of Fall, Pre-op evaluation, Syncope, and Syncope and collapse were also pertinent to this visit.    Recommendations:     Discharge Recommendations: Moderate Intensity Therapy  Discharge Equipment Recommendations:  bath bench, walker, rolling  Barriers to discharge:   decreased caregiver support, high risk for falls    Assessment:     Elda Yoder is a 73 y.o. female with a medical diagnosis of Syncope and collapse.  She presents with  Performance deficits affecting function are weakness, impaired endurance, impaired self care skills, impaired functional mobility, gait instability, impaired balance, decreased coordination, decreased lower extremity function, pain, decreased ROM, impaired skin, edema.     Pt improving and making good progress toward goals. She does requires repeated vc for safety awareness and proper transfes and ambulation with walker use.Continue with OT POC     Rehab Prognosis:  Good and Fair; patient would benefit from acute skilled OT services to address these deficits and reach maximum level of function.       Plan:     Patient to be seen 5 x/week to address the above listed problems via self-care/home management, therapeutic activities, therapeutic exercises  Plan of Care Expires: 04/27/24  Plan of Care Reviewed with: patient    Subjective     Chief Complaint: L hip pain 7/10, pt premedicated  Patient/Family Comments/goals: pt agreeable.  Pain/Comfort:  Pain Rating 1: 7/10  Location - Side 1: Left  Location - Orientation 1: generalized  Location 1: hip  Pain Addressed 1: Pre-medicate for activity, Reposition, Distraction, Cessation of  Activity  Pain Rating Post-Intervention 1: 5/10    Objective:     Communicated with: nurse prior to session.  Patient found up in chair with chair check, telemetry, peripheral IV upon OT entry to room.    General Precautions: Standard, fall, diabetic    Orthopedic Precautions:LLE weight bearing as tolerated  Braces: N/A  Respiratory Status: Room air     Occupational Performance:     Functional Mobility/Transfers:  Patient completed Sit <> Stand Transfer with contact guard assistance  with  rolling walker and 3 trials, slow transition   Functional Mobility: ambulated CGA/Min A wit RW for walker safety, proximity,. Pt needed repeated vc and min a to follow through. 2 point gi pattern used. Pt was taking short quick steps despite vc to slow down.    Activities of Daily Living:  Grooming: contact guard assistance washed face, brushed teeth/dentures standing inside RW at sink; brief moments of SBA for balance though pt visibly unsteady.  Upper Body Dressing: stand by assistance donned pullover with setup and jacket SBA seated in BS chair.  Lower Body Dressing: moderate assistance to thread LLE into (pull up brief and pants) garment  and mod A to pull over hips in standing with RW; max vc for maintaining one hand on RW at all rimes while puling over hips for safety; pt inconsistently followed through. Seated in BS chair.      [facility] 6 Click ADL: 19    Treatment & Education:  Purpose of OT and POC,pt  agreeable.  Self care and fx mobility as stated above.   Max repeat of vc for HP, walker proximity-pt stepping close to wheels and inconsistent with follwo through.  Safety awareness education-ongoing.  Call don' t fall explained and call bell use.  All questions./ concerns addressed within scope.     Patient left up in chair with all lines intact, call button in reach, and chair alarm on    GOALS:   Multidisciplinary Problems       Occupational Therapy Goals          Problem: Occupational Therapy    Goal Priority Disciplines  Outcome Interventions   Occupational Therapy Goal     OT, PT/OT Ongoing, Progressing    Description: Goals to be met by: 04/27     Patient will increase functional independence with ADLs by performing:    Grooming while standing at sink with Stand-by Assistance.  Toileting from bedside commode with Contact Guard Assistance for hygiene and clothing management.   Toilet transfer to bedside commode with Contact Guard Assistance.  Increased functional strength to WFL for ADLs.                         Time Tracking:     OT Date of Treatment: 03/28/24  OT Start Time: 1226  OT Stop Time: 1251  OT Total Time (min): 25 min    Billable Minutes:Self Care/Home Management 15  Therapeutic Activity 10  Total Time 25    OT/STEFANIE: OT          3/28/2024

## 2024-03-28 NOTE — PLAN OF CARE
Pt for d/c to [facility] today -   CM left VM with daughter Jacqueline advising her that pt is leaving today   Per Admit nurse Lazaro - she spoke with daughter earlier today and she agrees with transfer.     Nurse to call report to  --ask for Room 4 nurse     Wes Petersen to pick pt up at about 1:15 pm        03/28/24 1331   Final Note   Assessment Type Final Discharge Note   Anticipated Discharge Disposition SNF  ([facility])   What phone number can be called within the next 1-3 days to see how you are doing after discharge? [phone]   Hospital Resources/Appts/Education Provided Appointments scheduled and added to AVS   Post-Acute Status   Post-Acute Authorization Placement   Post-Acute Placement Status Set-up Complete/Auth obtained   Discharge Delays None known at this time

## 2024-03-28 NOTE — PROGRESS NOTES
[facility] Progress Note:     Chief Complaint:  L nondisplaced hip fracture   L hip CRPP (3/26/24)     HPI:  Pleasant 73F who sustained a GLF 5 days prior. Medical hx significant for DM (last A1c in system 6.7), HTN. CVA (no motor deficits-vertigo). She had a mechanical slip and fall in her kitchen, was able to ambulate afterwards. Presented today to outside ED for increased pain. CT w/ L subcapital femoral neck fx.      S: NAEON overnight. Pain controlled. Accepted SNF.  Few steps with therapy yesterday.     O:   LLE:  L hip dressing c/d/i  NonTTP about thigh, knee, leg, ankle, foot  ROM normal hip, knee, ankle  TA/gastroc/EHL/FHL intact with 5/5 strength  SILT grossly  2+ DP pulse     A/P:   -WBAT - PT/OT   -MM pain control   -Perez out 3/27 after ambulating   -Recommend 6 weeks ASA 81mg BID on discharge      Josselyn Lawrence MD  [facility]

## 2024-03-28 NOTE — PLAN OF CARE
Problem: Occupational Therapy  Goal: Occupational Therapy Goal  Description: Goals to be met by: 04/27     Patient will increase functional independence with ADLs by performing:    Grooming while standing at sink with Stand-by Assistance.  Toileting from bedside commode with Contact Guard Assistance for hygiene and clothing management.   Toilet transfer to bedside commode with Contact Guard Assistance.  Increased functional strength to WFL for ADLs.    Outcome: Ongoing, Progressing

## 2024-03-28 NOTE — PLAN OF CARE
Problem: Physical Therapy  Goal: Physical Therapy Goal  Description: Goals to be met by: 2024     Patient will increase functional independence with mobility by performin. Supine to sit with Modified [place]  2. Sit to supine with Modified [place]  3. Sit to stand transfer with Supervision  4. Bed to chair transfer with Supervision using Rolling Walker  5. Gait  x 75 feet with Stand-by Assistance using Rolling Walker.   6. Ascend/descend 3 stair with bilateral Handrails Contact Guard Assistance.      3/28/2024 1127 by Kurt Dyer, PTA  Outcome: Ongoing, Progressing  3/28/2024 1127 by Kurt Dyer, PTA  Outcome: Ongoing, Progressing

## 2024-03-28 NOTE — PROGRESS NOTES
Per Lazaro with [facility] - auth rec'd -- Lazaro     Orders sent per Naveen for her review   She has spoken with pt's daughter - she agrees with plcmt

## 2024-03-28 NOTE — PROGRESS NOTES
Family Medicine  Progress Note    Patient Name: Elda Yoder  MRN: [id number]  Patient Class: IP- Inpatient   Admission Date: 3/25/2024  Length of Stay: 3 days  Attending Physician: Ian Sharif MD  Primary Care Provider: Good, Provider        Subjective:     Principal Problem:Syncope and collapse    HPI:  Patient is a 73 y.o.-year-old female w/ PMHx CVA in 2014, loop recorder since 2014 for syncope since 2014, DM (5.8 A1c 3/25/24), HTN, osteoarthritis who presents to the ED with reports of a fall five days ago. She states five days prior to admission she felt dizzy in her kitchen when she fell and hit her head and her left hip. She denies loss of consciousness, she states that it was a light bump which left no lacerations or contusions, denies vision changes, nausea, vomiting, chest pain or palpitations, or tongue biting when she fell. She has not been having nausea, vomiting, or vision changes for the past five days since fall. She endorses falling on her left hip as well which was initially painful but she has been able to ambulate short distances and is not tender to palpation. She denies any fevers, chills, shortness of breath since the fall. Of note, patient states that she was originally on amlodipine 10 for years and was recently started on hyzaar approximately 2-3 weeks ago and she has been compliant with all her medications. She does not recall what her blood pressure is on her home medications, but she states it is in the 170s when she is off of them.     In the ED, initial vitals Temp 98.5F, /84, HR 87, RR 17, SpO2 100% on room air. CBC was WNL, CMP was WNL. And Estimated Creatinine Clearance: 29.6 mL/min (based on SCr of 1.36 mg/dL).  CXR showed cardiac monitoring device, no cardiomegaly and no effusions, hip x-ray showed left subcapital femoral neck fracture, CT of hip confirmednondisplaced subcapital left femoral neck fracture, EKG showed normal sinus rhythm with prolonged QTc of  506.   [facility] admitted patient for evaluation for syncope and medical optimization for procedure for left femoral neck fracture.         Interval History: no acute events overnight, denies f/c/n/v SOB or chest pain. Left hip pain post op    Review of Systems   Constitutional:  Negative for fever.   Respiratory:  Negative for cough and shortness of breath.    Cardiovascular:  Negative for chest pain and palpitations.   Gastrointestinal:  Negative for abdominal pain, diarrhea, nausea and vomiting.   Genitourinary:  Negative for dysuria and frequency.   Musculoskeletal:  Positive for arthralgias and myalgias.   Neurological:  Negative for dizziness and headaches.     Objective:     Vital Signs (Most Recent):  Temp: 97.9 °F (36.6 °C) (03/28/24 0336)  Pulse: 70 (03/28/24 0416)  Resp: 20 (03/28/24 0403)  BP: 135/77 (03/28/24 0336)  SpO2: 98 % (03/28/24 0336) Vital Signs (24h Range):  Temp:  [97.9 °F (36.6 °C)-98.6 °F (37 °C)] 97.9 °F (36.6 °C)  Pulse:  [64-87] 70  Resp:  [16-20] 20  SpO2:  [95 %-98 %] 98 %  BP: (115-141)/(60-82) 135/77     Weight: 55 kg (121 lb 4.1 oz)  Body mass index is 23.68 kg/m².    Intake/Output Summary (Last 24 hours) at 3/28/2024 0650  Last data filed at 3/28/2024 0414  Gross per 24 hour   Intake --   Output 400 ml   Net -400 ml         Physical Exam  Constitutional:       Appearance: Normal appearance.   HENT:      Head: Normocephalic and atraumatic.   Cardiovascular:      Rate and Rhythm: Normal rate and regular rhythm.      Pulses: Normal pulses.      Heart sounds: Normal heart sounds. No murmur heard.  Pulmonary:      Effort: Pulmonary effort is normal. No respiratory distress.      Breath sounds: Normal breath sounds. No stridor. No wheezing, rhonchi or rales.   Abdominal:      General: Abdomen is flat.      Palpations: Abdomen is soft.      Tenderness: There is no abdominal tenderness.   Musculoskeletal:      Right lower leg: No edema.      Left lower leg: No edema.       Comments: Tenderness to L hip  Bandaged  Warm NV intact    Neurological:      General: No focal deficit present.      Mental Status: She is alert and oriented to person, place, and time.   Psychiatric:         Mood and Affect: Mood normal.         Behavior: Behavior normal.         Thought Content: Thought content normal.         Judgment: Judgment normal.             Significant Labs: All pertinent labs within the past 24 hours have been reviewed.  CBC:   Recent Labs   Lab 03/27/24  0333 03/28/24  0511   WBC 8.75 8.89   HGB 11.7* 10.6*   HCT 36.5* 33.2*    301     CMP:   Recent Labs   Lab 03/27/24  0333 03/28/24  0511    142   K 4.6 4.3    108   CO2 23 24   * 110   BUN 34* 38*   CREATININE 1.7* 1.7*   CALCIUM 9.2 9.1   PROT 6.9 6.6   ALBUMIN 3.5 3.3*   BILITOT 0.3 0.3   ALKPHOS 68 66   AST 13 11   ALT <5* <5*   ANIONGAP 10 10       Significant Imaging: I have reviewed all pertinent imaging results/findings within the past 24 hours.    Assessment/Plan:      * Syncope and collapse  Patient with history of episodes of syncope since 2014, patient has a loop recorder visualized on CXR and confirmed on chart review. She had an episode of dizziness followed by fall with no LOC or seizures five days prior to admission. She denied any nausea/vomiting, chest pain, SOB associated with her dizziness.   Troponin negative, lactic acid negative, EKG on admit showed normal sinus rhythm with prolonged QTc of 506. On day of admit she denied any headache, dizziness, vision changes.   Echo reviewed, patient went to surgery w/Cards optimization... no need for further Cards work up    Plan:          Fracture of femoral neck, left  Fall 5 days prior to admission, patient able to ambulate but shorter distances than before fall. 5 day history of left hip pain to movement, no acute contusions or lacerations visualized. Hip xray showed left subcapital femoral neck fracture confirmed on CT which showed nondisplaced  left femoral neck fracture as well.    Plan:  Pre-operative evaluation with risk assessment              -           Scheduled for left hip pinning on 3/26 by Dr. Bloom  -           DASI score: 30.2 w/ Functional Capacity in METS: 6.45 (>4) with a revised cardiac index   risk of 10.1%.    - Not on antiplatelets/anticoagulation.   - no h/o blood dyscrasias or previous reaction to anesthesia   - is a smoker. 1 ppd.  - has prior h/o HLD/CAD w/ CVA. ASCVD: 13.7%. Continue medical management w/ atorvastatin 20  - has prior h/o DM. Last HgbA1C:~5.8  - has no prior h/o thyroid disease. TSH: pending  - has prior h/o HTN. BP: 124/59.   - has no prior h/o COPD/Asthma/GRETCHEN/OHS, suspected COPD on prior notes from previous providors. Does not use CPAP.   - has no prior h/o HF. Echo: pending  - BMI: Body mass index is 23.68 kg/m².              -           The patient is not medically optimized to proceed with (per ACC/AHA reshma-operative guidelines) a moderate risk surgery.      PLAN:  DVT PPX on POD1 per ortho            Hyperlipemia  ASCVD (Atherosclerotic Cardiovascular Disease) 2013 Risk Calculator from AHA/ACC from [employer]  on 3/26/2024  ** All calculations should be rechecked by clinician prior to use **    RESULT SUMMARY:     Moderate- to high-intensity statin recommended because 10-year risk >7.5%    To view statin dosages by intensity, see Evidence section.    13.7%    Risk of cardiovascular event (coronary or stroke death or non-fatal MI or stroke) in next 10 years.    7.7%    10-year cardiovascular risk if risk factors were optimal.      INPUTS:  Age --> 73 years  Diabetes --> 0 = No  Sex --> 0 = Female  Smoker --> 1 = Yes  Total cholesterol --> 215 mg/dL  HDL cholesterol --> 51 mg/dL  Systolic blood pressure --> 114 mm Hg  Treatment for hypertension --> 0 = No  Race --> 1 = White    Plan:  atorvastatin 20    Osteoarthritis        History of CVA (cerebrovascular accident)  Patient with history of CVA in 2014, long  history of vertigo after CVA, could be residual effect after CVA.     Plan:  Atorvastatin 20    Essential hypertension  Chronic, controlled. Latest blood pressure and vitals reviewed-     Temp:  [97.9 °F (36.6 °C)-98.6 °F (37 °C)]   Pulse:  [64-87]   Resp:  [16-20]   BP: (115-141)/(60-82)   SpO2:  [95 %-98 %] .   Home meds for hypertension were reviewed and noted below.   Hypertension Medications               amLODIPine (NORVASC) 10 MG tablet Take 1 tablet (10 mg total) by mouth once daily.    losartan-hydrochlorothiazide 50-12.5 mg (HYZAAR) 50-12.5 mg per tablet Take 1 tablet by mouth once daily.          Will utilize p.r.n. blood pressure medication only if patient's blood pressure greater than 180/110 and she develops symptoms such as worsening chest pain or shortness of breath.  BP well controlled today    Plan:  Continue home amlodipine as it is a historic medication  Hold hyzaar as it is a more recent medication, consider adding if morning BP is elevated      VTE Risk Mitigation (From admission, onward)           Ordered     Reason for No Pharmacological VTE Prophylaxis  Once        Question:  Reasons:  Answer:  Physician Provided (leave comment)  Comment:  possible surgery    03/25/24 2129     IP VTE HIGH RISK PATIENT  Once         03/25/24 2129     Place sequential compression device  Until discontinued         03/25/24 2129                    Discharge Planning   CESILIA:      Code Status: Full Code   Is the patient medically ready for discharge?:     Reason for patient still in hospital (select all that apply): Patient trending condition  Discharge Plan A: Skilled Nursing Facility        Too Hsu MD  [facility], PGY-1  03/28/2024

## 2024-03-28 NOTE — SUBJECTIVE & OBJECTIVE
Interval History: no acute events overnight, denies f/c/n/v SOB or chest pain. Left hip pain post op    Review of Systems   Constitutional:  Negative for fever.   Respiratory:  Negative for cough and shortness of breath.    Cardiovascular:  Negative for chest pain and palpitations.   Gastrointestinal:  Negative for abdominal pain, diarrhea, nausea and vomiting.   Genitourinary:  Negative for dysuria and frequency.   Musculoskeletal:  Positive for arthralgias and myalgias.   Neurological:  Negative for dizziness and headaches.     Objective:     Vital Signs (Most Recent):  Temp: 97.9 °F (36.6 °C) (03/28/24 0336)  Pulse: 70 (03/28/24 0416)  Resp: 20 (03/28/24 0403)  BP: 135/77 (03/28/24 0336)  SpO2: 98 % (03/28/24 0336) Vital Signs (24h Range):  Temp:  [97.9 °F (36.6 °C)-98.6 °F (37 °C)] 97.9 °F (36.6 °C)  Pulse:  [64-87] 70  Resp:  [16-20] 20  SpO2:  [95 %-98 %] 98 %  BP: (115-141)/(60-82) 135/77     Weight: 55 kg (121 lb 4.1 oz)  Body mass index is 23.68 kg/m².    Intake/Output Summary (Last 24 hours) at 3/28/2024 0672  Last data filed at 3/28/2024 0414  Gross per 24 hour   Intake --   Output 400 ml   Net -400 ml         Physical Exam  Constitutional:       Appearance: Normal appearance.   HENT:      Head: Normocephalic and atraumatic.   Cardiovascular:      Rate and Rhythm: Normal rate and regular rhythm.      Pulses: Normal pulses.      Heart sounds: Normal heart sounds. No murmur heard.  Pulmonary:      Effort: Pulmonary effort is normal. No respiratory distress.      Breath sounds: Normal breath sounds. No stridor. No wheezing, rhonchi or rales.   Abdominal:      General: Abdomen is flat.      Palpations: Abdomen is soft.      Tenderness: There is no abdominal tenderness.   Musculoskeletal:      Right lower leg: No edema.      Left lower leg: No edema.      Comments: Tenderness to L hip  Bandaged  Warm NV intact    Neurological:      General: No focal deficit present.      Mental Status: She is alert and  oriented to person, place, and time.   Psychiatric:         Mood and Affect: Mood normal.         Behavior: Behavior normal.         Thought Content: Thought content normal.         Judgment: Judgment normal.             Significant Labs: All pertinent labs within the past 24 hours have been reviewed.  CBC:   Recent Labs   Lab 03/27/24  0333 03/28/24  0511   WBC 8.75 8.89   HGB 11.7* 10.6*   HCT 36.5* 33.2*    301     CMP:   Recent Labs   Lab 03/27/24 0333 03/28/24  0511    142   K 4.6 4.3    108   CO2 23 24   * 110   BUN 34* 38*   CREATININE 1.7* 1.7*   CALCIUM 9.2 9.1   PROT 6.9 6.6   ALBUMIN 3.5 3.3*   BILITOT 0.3 0.3   ALKPHOS 68 66   AST 13 11   ALT <5* <5*   ANIONGAP 10 10       Significant Imaging: I have reviewed all pertinent imaging results/findings within the past 24 hours.

## 2024-03-28 NOTE — ASSESSMENT & PLAN NOTE
Chronic, controlled. Latest blood pressure and vitals reviewed-     Temp:  [97.9 °F (36.6 °C)-98.6 °F (37 °C)]   Pulse:  [64-87]   Resp:  [16-20]   BP: (115-141)/(60-82)   SpO2:  [95 %-98 %] .   Home meds for hypertension were reviewed and noted below.   Hypertension Medications               amLODIPine (NORVASC) 10 MG tablet Take 1 tablet (10 mg total) by mouth once daily.    losartan-hydrochlorothiazide 50-12.5 mg (HYZAAR) 50-12.5 mg per tablet Take 1 tablet by mouth once daily.          Will utilize p.r.n. blood pressure medication only if patient's blood pressure greater than 180/110 and she develops symptoms such as worsening chest pain or shortness of breath.  BP well controlled today    Plan:  Continue home amlodipine as it is a historic medication  Hold hyzaar as it is a more recent medication, consider adding if morning BP is elevated

## 2024-03-28 NOTE — PT/OT/SLP PROGRESS
Physical Therapy Treatment    Patient Name:  Elda Yoder   MRN:  [id number]    Recommendations:     Discharge Recommendations: Moderate Intensity Therapy  Discharge Equipment Recommendations:  (TBD next level of care)  Barriers to discharge:  decreased mobility,strength and endurance    Assessment:     Elda Yoder is a 73 y.o. female admitted with a medical diagnosis of Syncope and collapse.  She presents with the following impairments/functional limitations: weakness, impaired endurance, impaired functional mobility, gait instability, impaired balance, decreased lower extremity function, pain, decreased ROM, impaired coordination, impaired joint extensibility, orthopedic precautions,pt with good participation and requires assistanace with all mobility at this time,pt will benefit from moderate intensity therapy upon discharge.    Rehab Prognosis: Good; patient would benefit from acute skilled PT services to address these deficits and reach maximum level of function.    Recent Surgery: Procedure(s) (LRB):  PINNING, HIP (Left) 2 Days Post-Op    Plan:     During this hospitalization, patient to be seen daily to address the identified rehab impairments via gait training, therapeutic activities, therapeutic exercises, neuromuscular re-education and progress toward the following goals:    Plan of Care Expires:  04/26/24    Subjective     Chief Complaint: n/a  Patient/Family Comments/goals: pt agreeable to rx.  Pain/Comfort:  Pain Rating 1: 6/10  Location - Side 1: Right  Location - Orientation 1: generalized  Location 1: hip  Pain Addressed 1: Reposition, Distraction, Nurse notified      Objective:     Communicated with nsg prior to session.  Patient found supine with bed alarm, peripheral IV upon PT entry to room.     General Precautions: Standard, fall  Orthopedic Precautions: LLE weight bearing as tolerated  Braces: N/A  Respiratory Status: Room air     Functional Mobility:  Bed Mobility:     Supine to Sit:  moderate assistance  Transfers:     Sit to Stand:  minimum assistance with rolling walker  Bed to Chair: minimum assistance and v/c's with  rolling walker  using  Step Transfer  Gait: amb ~6' up/back with RW and Min A with v/c's  Balance: fair standing balance with RW      AM-PAC 6 CLICK MOBILITY  Turning over in bed (including adjusting bedclothes, sheets and blankets)?: 2  Moving from lying on back to sitting on the side of the bed?: 3  Moving to and from a bed to a chair (including a wheelchair)?: 3  Need to walk in hospital room?: 3 (increased time)  Climbing 3-5 steps with a railing?: 1       Treatment & Education: pt sat EOB ~5 mins with S,pre gait stepping inside RW X 10 reps with Min A and v/c's,le supine ex's X 10 reps inc: ap,hs,abd/add with assist at L le,rest periods prn.      Patient left up in chair with all lines intact, call button in reach, bed alarm on, and nsg notified..    GOALS: see general POC  Multidisciplinary Problems       Physical Therapy Goals          Problem: Physical Therapy    Goal Priority Disciplines Outcome Goal Variances Interventions   Physical Therapy Goal     PT, PT/OT Ongoing, Progressing     Description: Goals to be met by: 2024     Patient will increase functional independence with mobility by performin. Supine to sit with Modified [place]  2. Sit to supine with Modified [place]  3. Sit to stand transfer with Supervision  4. Bed to chair transfer with Supervision using Rolling Walker  5. Gait  x 75 feet with Stand-by Assistance using Rolling Walker.   6. Ascend/descend 3 stair with bilateral Handrails Contact Guard Assistance.                           Time Tracking:     PT Received On: 24  PT Start Time: 904     PT Stop Time: 932  PT Total Time (min): 28 min     Billable Minutes: Gait Training 10, Therapeutic Activity 9, and Therapeutic Exercise 9    Treatment Type: Treatment  PT/PTA: PTA     Number of PTA visits since last PT visit: 1      03/28/2024

## 2024-03-28 NOTE — PROGRESS NOTES
Individual Follow-Up Form    3/28/2024    Quit Date: To be determined    Clinical Status of Patient: Inpatient    Length of Service: 30 minutes    Comments: Smoking cessation education note: Pt is a 1.5 pk/day cigarette smoker x 58 yrs. Order obtained for 21 mg nicotine patch Q day. Pt states ready to quit smoking. Ambulatory referral to Smoking Cessation clinic following hospital discharge.     Diagnosis: F17.210

## 2024-04-05 DIAGNOSIS — S72.002A CLOSED FRACTURE OF NECK OF LEFT FEMUR, INITIAL ENCOUNTER: Primary | ICD-10-CM

## (undated) DEVICE — SPONGE LAP 18X18 PREWASHED

## (undated) DEVICE — SUT VICRYL PLUS 0 CT1 36IN

## (undated) DEVICE — GOWN POLY REINF BRTH SLV LG

## (undated) DEVICE — SUT 2-0 VICRYL / CT-1

## (undated) DEVICE — ELECTRODE REM PLYHSV RETURN 9

## (undated) DEVICE — PADDING WYTEX UNDRCST 6INX4YD

## (undated) DEVICE — ALCOHOL 70% ISOP W/GREEN 16OZ

## (undated) DEVICE — TRAY FOLEY 16FR INFECTION CONT

## (undated) DEVICE — DRAPE STERI U-SHAPED 47X51IN

## (undated) DEVICE — PACK SURGERY START

## (undated) DEVICE — BLADE SURG CARBON STEEL #10

## (undated) DEVICE — DRAPE STERI-DRAPE 83X125 IOBAN

## (undated) DEVICE — ADHESIVE DERMABOND ADVANCED

## (undated) DEVICE — GLOVE SENSICARE PI GRN 8

## (undated) DEVICE — PAD ABDOMINAL STERILE 5X9IN

## (undated) DEVICE — SUPPORT ULNA NERVE PROTECTOR

## (undated) DEVICE — SPONGE COTTON TRAY 4X4IN

## (undated) DEVICE — MANIFOLD 4 PORT

## (undated) DEVICE — COVER PROXIMA MAYO STAND

## (undated) DEVICE — GLOVE SENSICARE PI SURG 7.5

## (undated) DEVICE — DURAPREP SURG SCRUB 26ML

## (undated) DEVICE — BNDG COFLEX FOAM LF2 ST 6X5YD

## (undated) DEVICE — GOWN POLY REINF BRTH SLV XL

## (undated) DEVICE — DRESSING AQUACEL AG 3.5X10IN

## (undated) DEVICE — PACK BASIC

## (undated) DEVICE — DRESSING COVER AQUACEL AG SURG

## (undated) DEVICE — BIT DRILL CANN QC 5X300X130MM

## (undated) DEVICE — COVER OVERHEAD SURG LT BLUE

## (undated) DEVICE — TOWEL OR DISP STRL BLUE 4/PK

## (undated) DEVICE — SUT CTD VICRYL CT-1 27